# Patient Record
Sex: MALE | Race: WHITE | NOT HISPANIC OR LATINO | Employment: OTHER | URBAN - METROPOLITAN AREA
[De-identification: names, ages, dates, MRNs, and addresses within clinical notes are randomized per-mention and may not be internally consistent; named-entity substitution may affect disease eponyms.]

---

## 2024-05-01 ENCOUNTER — TELEPHONE (OUTPATIENT)
Dept: SLEEP MEDICINE | Facility: HOSPITAL | Age: 74
End: 2024-05-01
Payer: OTHER GOVERNMENT

## 2024-05-01 DIAGNOSIS — R06.81 WITNESSED EPISODE OF APNEA: ICD-10-CM

## 2024-05-01 DIAGNOSIS — G47.33 OBSTRUCTIVE SLEEP APNEA (ADULT) (PEDIATRIC): Primary | ICD-10-CM

## 2024-05-01 DIAGNOSIS — R06.83 SNORING: ICD-10-CM

## 2024-08-26 ENCOUNTER — PROCEDURE VISIT (OUTPATIENT)
Dept: SLEEP MEDICINE | Facility: HOSPITAL | Age: 74
End: 2024-08-26
Attending: PSYCHIATRY & NEUROLOGY
Payer: OTHER GOVERNMENT

## 2024-08-26 DIAGNOSIS — R06.83 SNORING: ICD-10-CM

## 2024-08-26 DIAGNOSIS — G47.33 OBSTRUCTIVE SLEEP APNEA (ADULT) (PEDIATRIC): ICD-10-CM

## 2024-08-26 DIAGNOSIS — R06.81 WITNESSED EPISODE OF APNEA: ICD-10-CM

## 2024-08-26 PROCEDURE — 95811 POLYSOM 6/>YRS CPAP 4/> PARM: CPT | Mod: 26,,, | Performed by: PSYCHIATRY & NEUROLOGY

## 2024-08-26 PROCEDURE — 95811 POLYSOM 6/>YRS CPAP 4/> PARM: CPT

## 2025-07-19 ENCOUNTER — HOSPITAL ENCOUNTER (EMERGENCY)
Facility: HOSPITAL | Age: 75
Discharge: HOME | End: 2025-07-19
Attending: EMERGENCY MEDICINE
Payer: MEDICARE

## 2025-07-19 VITALS
RESPIRATION RATE: 20 BRPM | HEIGHT: 74 IN | HEART RATE: 60 BPM | OXYGEN SATURATION: 99 % | DIASTOLIC BLOOD PRESSURE: 59 MMHG | BODY MASS INDEX: 31.18 KG/M2 | WEIGHT: 243 LBS | SYSTOLIC BLOOD PRESSURE: 133 MMHG | TEMPERATURE: 98 F

## 2025-07-19 VITALS
HEIGHT: 75 IN | OXYGEN SATURATION: 97 % | RESPIRATION RATE: 20 BRPM | HEART RATE: 58 BPM | SYSTOLIC BLOOD PRESSURE: 130 MMHG | BODY MASS INDEX: 30.21 KG/M2 | TEMPERATURE: 98 F | DIASTOLIC BLOOD PRESSURE: 51 MMHG | WEIGHT: 243 LBS

## 2025-07-19 DIAGNOSIS — S52.592A OTHER CLOSED FRACTURE OF DISTAL END OF LEFT RADIUS, INITIAL ENCOUNTER: Primary | ICD-10-CM

## 2025-07-19 DIAGNOSIS — S42.302D CLOSED FRACTURE OF LEFT UPPER EXTREMITY WITH ROUTINE HEALING, SUBSEQUENT ENCOUNTER: Primary | ICD-10-CM

## 2025-07-19 DIAGNOSIS — S52.615A CLOSED NONDISPLACED FRACTURE OF STYLOID PROCESS OF LEFT ULNA, INITIAL ENCOUNTER: ICD-10-CM

## 2025-07-19 DIAGNOSIS — S52.002A CLOSED FRACTURE OF PROXIMAL END OF LEFT ULNA, UNSPECIFIED FRACTURE MORPHOLOGY, INITIAL ENCOUNTER: ICD-10-CM

## 2025-07-19 DIAGNOSIS — S82.51XA CLOSED AVULSION FRACTURE OF MEDIAL MALLEOLUS OF RIGHT TIBIA, INITIAL ENCOUNTER: ICD-10-CM
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
## 2025-07-19 DIAGNOSIS — R55 NEAR SYNCOPE: ICD-10-CM

## 2025-07-19 LAB
ALBUMIN SERPL-MCNC: 4.4 G/DL (ref 3.4–4.8)
ALBUMIN/GLOB SERPL: 1.4 RATIO (ref 1.1–2)
ALP SERPL-CCNC: 61 UNIT/L (ref 40–150)
ALT SERPL-CCNC: 21 UNIT/L (ref 0–55)
ANION GAP SERPL CALC-SCNC: 9 MEQ/L
APTT PPP: 37.9 SECONDS (ref 23.4–33.9)
AST SERPL-CCNC: 27 UNIT/L (ref 11–45)
BASOPHILS # BLD AUTO: 0.04 X10(3)/MCL
BASOPHILS NFR BLD AUTO: 0.3 %
BILIRUB SERPL-MCNC: 0.5 MG/DL
BNP BLD-MCNC: 55.1 PG/ML
BUN SERPL-MCNC: 14.2 MG/DL (ref 8.4–25.7)
CALCIUM SERPL-MCNC: 9.3 MG/DL (ref 8.8–10)
CHLORIDE SERPL-SCNC: 107 MMOL/L (ref 98–107)
CO2 SERPL-SCNC: 25 MMOL/L (ref 23–31)
CREAT SERPL-MCNC: 1.08 MG/DL (ref 0.72–1.25)
CREAT/UREA NIT SERPL: 13
EOSINOPHIL # BLD AUTO: 0.14 X10(3)/MCL (ref 0–0.9)
EOSINOPHIL NFR BLD AUTO: 1.2 %
ERYTHROCYTE [DISTWIDTH] IN BLOOD BY AUTOMATED COUNT: 13.2 % (ref 11.5–17)
GFR SERPLBLD CREATININE-BSD FMLA CKD-EPI: >60 ML/MIN/1.73/M2
GLOBULIN SER-MCNC: 3.1 GM/DL (ref 2.4–3.5)
GLUCOSE SERPL-MCNC: 97 MG/DL (ref 82–115)
HCT VFR BLD AUTO: 43.5 % (ref 42–52)
HCT VFR BLD AUTO: 45.4 % (ref 42–52)
HGB BLD-MCNC: 14.6 G/DL (ref 14–18)
HGB BLD-MCNC: 15.3 G/DL (ref 14–18)
IMM GRANULOCYTES # BLD AUTO: 0.1 X10(3)/MCL (ref 0–0.04)
IMM GRANULOCYTES NFR BLD AUTO: 0.9 %
INR PPP: 1.9
LYMPHOCYTES # BLD AUTO: 1.54 X10(3)/MCL (ref 0.6–4.6)
LYMPHOCYTES NFR BLD AUTO: 13.3 %
MCH RBC QN AUTO: 33.1 PG (ref 27–31)
MCHC RBC AUTO-ENTMCNC: 33.7 G/DL (ref 33–36)
MCV RBC AUTO: 98.3 FL (ref 80–94)
MONOCYTES # BLD AUTO: 1.01 X10(3)/MCL (ref 0.1–1.3)
MONOCYTES NFR BLD AUTO: 8.7 %
NEUTROPHILS # BLD AUTO: 8.79 X10(3)/MCL (ref 2.1–9.2)
NEUTROPHILS NFR BLD AUTO: 75.6 %
NRBC BLD AUTO-RTO: 0 %
PLATELET # BLD AUTO: 209 X10(3)/MCL (ref 130–400)
PMV BLD AUTO: 11.8 FL (ref 7.4–10.4)
POTASSIUM SERPL-SCNC: 3.8 MMOL/L (ref 3.5–5.1)
PROT SERPL-MCNC: 7.5 GM/DL (ref 5.8–7.6)
PROTHROMBIN TIME: 22.1 SECONDS (ref 11.7–14.5)
RBC # BLD AUTO: 4.62 X10(6)/MCL (ref 4.7–6.1)
SODIUM SERPL-SCNC: 141 MMOL/L (ref 136–145)
TROPONIN I SERPL-MCNC: <0.01 NG/ML (ref 0–0.04)
WBC # BLD AUTO: 11.62 X10(3)/MCL (ref 4.5–11.5)

## 2025-07-19 PROCEDURE — 90715 TDAP VACCINE 7 YRS/> IM: CPT | Performed by: EMERGENCY MEDICINE

## 2025-07-19 PROCEDURE — 99285 EMERGENCY DEPT VISIT HI MDM: CPT | Mod: 25

## 2025-07-19 PROCEDURE — 96374 THER/PROPH/DIAG INJ IV PUSH: CPT

## 2025-07-19 PROCEDURE — 85610 PROTHROMBIN TIME: CPT | Performed by: EMERGENCY MEDICINE

## 2025-07-19 PROCEDURE — 96372 THER/PROPH/DIAG INJ SC/IM: CPT | Mod: 59 | Performed by: EMERGENCY MEDICINE

## 2025-07-19 PROCEDURE — 29105 APPLICATION LONG ARM SPLINT: CPT | Mod: LT

## 2025-07-19 PROCEDURE — 25000003 PHARM REV CODE 250: Performed by: EMERGENCY MEDICINE

## 2025-07-19 PROCEDURE — 90471 IMMUNIZATION ADMIN: CPT | Performed by: EMERGENCY MEDICINE

## 2025-07-19 PROCEDURE — 63600175 PHARM REV CODE 636 W HCPCS: Performed by: EMERGENCY MEDICINE

## 2025-07-19 PROCEDURE — 93005 ELECTROCARDIOGRAM TRACING: CPT

## 2025-07-19 PROCEDURE — 25500020 PHARM REV CODE 255: Performed by: EMERGENCY MEDICINE

## 2025-07-19 PROCEDURE — 85018 HEMOGLOBIN: CPT | Performed by: EMERGENCY MEDICINE

## 2025-07-19 PROCEDURE — 83880 ASSAY OF NATRIURETIC PEPTIDE: CPT | Performed by: EMERGENCY MEDICINE

## 2025-07-19 PROCEDURE — 96375 TX/PRO/DX INJ NEW DRUG ADDON: CPT

## 2025-07-19 PROCEDURE — 93010 ELECTROCARDIOGRAM REPORT: CPT | Mod: ,,, | Performed by: INTERNAL MEDICINE

## 2025-07-19 PROCEDURE — 85025 COMPLETE CBC W/AUTO DIFF WBC: CPT | Performed by: EMERGENCY MEDICINE

## 2025-07-19 PROCEDURE — 80053 COMPREHEN METABOLIC PANEL: CPT | Performed by: EMERGENCY MEDICINE

## 2025-07-19 PROCEDURE — 84484 ASSAY OF TROPONIN QUANT: CPT | Performed by: EMERGENCY MEDICINE

## 2025-07-19 PROCEDURE — 99284 EMERGENCY DEPT VISIT MOD MDM: CPT | Mod: 25,27

## 2025-07-19 PROCEDURE — 85730 THROMBOPLASTIN TIME PARTIAL: CPT | Performed by: EMERGENCY MEDICINE

## 2025-07-19 PROCEDURE — 63600175 PHARM REV CODE 636 W HCPCS: Mod: JZ,TB | Performed by: EMERGENCY MEDICINE

## 2025-07-19 RX ORDER — OXYCODONE AND ACETAMINOPHEN 5; 325 MG/1; MG/1
1 TABLET ORAL EVERY 6 HOURS PRN
Qty: 16 TABLET | Refills: 0 | Status: ON HOLD | OUTPATIENT
Start: 2025-07-19 | End: 2025-07-25 | Stop reason: HOSPADM

## 2025-07-19 RX ORDER — HYDROCODONE BITARTRATE AND ACETAMINOPHEN 5; 325 MG/1; MG/1
1 TABLET ORAL EVERY 6 HOURS PRN
Qty: 12 TABLET | Refills: 0 | Status: CANCELLED | OUTPATIENT
Start: 2025-07-19

## 2025-07-19 RX ORDER — DIPHENHYDRAMINE HYDROCHLORIDE 50 MG/ML
25 INJECTION, SOLUTION INTRAMUSCULAR; INTRAVENOUS
Status: COMPLETED | OUTPATIENT
Start: 2025-07-19 | End: 2025-07-19

## 2025-07-19 RX ORDER — METHYLPREDNISOLONE SOD SUCC 125 MG
125 VIAL (EA) INJECTION
Status: COMPLETED | OUTPATIENT
Start: 2025-07-19 | End: 2025-07-19

## 2025-07-19 RX ORDER — FENTANYL CITRATE 50 UG/ML
50 INJECTION, SOLUTION INTRAMUSCULAR; INTRAVENOUS
Refills: 0 | Status: COMPLETED | OUTPATIENT
Start: 2025-07-19 | End: 2025-07-19

## 2025-07-19 RX ORDER — OXYCODONE AND ACETAMINOPHEN 5; 325 MG/1; MG/1
1 TABLET ORAL
Refills: 0 | Status: COMPLETED | OUTPATIENT
Start: 2025-07-19 | End: 2025-07-19

## 2025-07-19 RX ADMIN — IOHEXOL 100 ML: 350 INJECTION, SOLUTION INTRAVENOUS at 05:07

## 2025-07-19 RX ADMIN — CLOSTRIDIUM TETANI TOXOID ANTIGEN (FORMALDEHYDE INACTIVATED), CORYNEBACTERIUM DIPHTHERIAE TOXOID ANTIGEN (FORMALDEHYDE INACTIVATED), BORDETELLA PERTUSSIS TOXOID ANTIGEN (GLUTARALDEHYDE INACTIVATED), BORDETELLA PERTUSSIS FILAMENTOUS HEMAGGLUTININ ANTIGEN (FORMALDEHYDE INACTIVATED), BORDETELLA PERTUSSIS PERTACTIN ANTIGEN, AND BORDETELLA PERTUSSIS FIMBRIAE 2/3 ANTIGEN 0.5 ML: 5; 2; 2.5; 5; 3; 5 INJECTION, SUSPENSION INTRAMUSCULAR at 12:07

## 2025-07-19 RX ADMIN — FENTANYL CITRATE 50 MCG: 50 INJECTION INTRAMUSCULAR; INTRAVENOUS at 12:07

## 2025-07-19 RX ADMIN — METHYLPREDNISOLONE SODIUM SUCCINATE 125 MG: 125 INJECTION, POWDER, FOR SOLUTION INTRAMUSCULAR; INTRAVENOUS at 04:07

## 2025-07-19 RX ADMIN — DIPHENHYDRAMINE HYDROCHLORIDE 25 MG: 50 INJECTION INTRAMUSCULAR; INTRAVENOUS at 04:07

## 2025-07-19 RX ADMIN — OXYCODONE HYDROCHLORIDE AND ACETAMINOPHEN 1 TABLET: 5; 325 TABLET ORAL at 06:07

## 2025-07-19 NOTE — ED PROVIDER NOTES
Encounter Date: 7/19/2025       History     Chief Complaint   Patient presents with    Wrist Injury     Pt c/o pain to left wrist s/p ladder falling down with him on third rung. Pt also c/o pain to right 5th finger, right ankle and bilateral shin.     Patient is a 74 yo M presenting with R ankle pain, Left arm pain s/p fall. He was on a ladder and on approxmately the 3rd rung from the ground when it started to slip to the side. He rode the ladder down to the ground. The 3rd rung was from approx 3-5 ft. He did not strike his head. He had no Loc. He is on xarelto. Prior to this he was in his normal state of health. At this time he c/o mild abrasion to the R 5th digit, left wrist, bilateral hips, and R ankle. No neck pain, worse from baseline back pain, numbness, or tingling. No fevers, chills, chest pain, sob.         Review of patient's allergies indicates:   Allergen Reactions    Iodine Hives, Itching, Rash and Swelling     Other Reaction(s): Unknown    Amitriptyline     Amlodipine      Other Reaction(s): throat tightness    Clopidogrel      Other Reaction(s): Unknown    Codeine     Digoxin Other (See Comments)     Other Reaction(s): GYNECOMASTIA    Gynecomastia    Finasteride Other (See Comments)     Gynecomastia    Lovastatin      Other Reaction(s): Abdominal pain    Methadone      Other Reaction(s): Sedated    Metoprolol succinate      Other Reaction(s): low heart rate    Pravastatin      Other Reaction(s): INEFFECTIVE    Tramadol     Methocarbamol Nausea And Vomiting    Penicillins Rash     Other Reaction(s): Eruption of skin, O/E - lip swelling, Eye Swelling, Edema Of Pharynx, O/E - lip swelling, Eye Swelling, Edema Of Pharynx, O/E - lip swelling, Eye Swelling, Edema Of Pharynx, SWELLING-LIPS, SWELLING-THROAT, SWELLING-EYES, SWELLING-LIPS, SWELLING-THROAT, SWELLING-EYES, SWELLING-LIPS, SWELLING-THROAT, SWELLING-EYES, Unknown     No past medical history on file.  No past surgical history on file.  No family  history on file.  Social History[1]  Review of Systems   All other systems reviewed and are negative.      Physical Exam     Initial Vitals [07/19/25 1159]   BP Pulse Resp Temp SpO2   (!) 133/59 60 20 98.2 °F (36.8 °C) 99 %      MAP       --         Physical Exam    Nursing note and vitals reviewed.  Constitutional: He appears well-developed and well-nourished. He is not diaphoretic. No distress.   HENT:   Head: Normocephalic and atraumatic.   Eyes: Conjunctivae are normal.   No midline c spine ttp   Neck: Neck supple.   Cardiovascular:  Normal rate, regular rhythm and normal heart sounds.           Pulmonary/Chest: Breath sounds normal. No respiratory distress. He has no wheezes. He has no rhonchi.   Abdominal: Abdomen is soft. Bowel sounds are normal. He exhibits no distension. There is no abdominal tenderness. There is no rebound and no guarding.   Musculoskeletal:         General: No edema. Normal range of motion.      Cervical back: Neck supple.      Comments: No midline TTP of the thoracic or lumbar spine. No step offs or deformities. No trauma noted to the chest, back, or abdomen.   RUE: Neurovascularly intact. There is  small abrasion to the R 5th digit. ROM intact.  LUE: Radial pulse +2. Cap refill < 2 sec. Patient able to flex and extend digits in that hand. There is + mild deformity of the left distal forearm. No open wounds. There is mild TTP of the left shoulder but no deformity noted.     RLE: Neurovascularly intact. Abrasion to the R anterior shin. There is moderate TTP of the R hip ROM intact. R ankle has mild to moderate diffuse swelling no deformity. There is focal TTP of the medial malleolus. No open wounds.   LLE: Neurovascularly intact. Abrasions to the left anterior shin. Mild TTP of the left lateral hip. ROM intact.      Neurological: He is alert and oriented to person, place, and time.   Skin: Skin is warm and dry.   Psychiatric: He has a normal mood and affect. Thought content normal.          ED Course   Procedures  Labs Reviewed   PROTIME-INR - Abnormal       Result Value    PT 22.1 (*)     INR 1.9 (*)     Narrative:     Protimes are used to monitor anticoagulant agents such as warfarin. PT INR values are based on the current patient normal mean and the HOMER value for the specific instrument reagent used.  **Routine theraputic target values for the INR are 2.0-3.0**   APTT - Abnormal    PTT 37.9 (*)    CBC WITH DIFFERENTIAL - Abnormal    WBC 11.62 (*)     RBC 4.62 (*)     Hgb 15.3      Hct 45.4      MCV 98.3 (*)     MCH 33.1 (*)     MCHC 33.7      RDW 13.2      Platelet 209      MPV 11.8 (*)     Neut % 75.6      Lymph % 13.3      Mono % 8.7      Eos % 1.2      Basophil % 0.3      Imm Grans % 0.9      Neut # 8.79      Lymph # 1.54      Mono # 1.01      Eos # 0.14      Baso # 0.04      Imm Gran # 0.10 (*)     NRBC% 0.0     CBC W/ AUTO DIFFERENTIAL    Narrative:     The following orders were created for panel order CBC auto differential.  Procedure                               Abnormality         Status                     ---------                               -----------         ------                     CBC with Differential[[id number]]       Abnormal            Final result                 Please view results for these tests on the individual orders.   COMPREHENSIVE METABOLIC PANEL    Sodium 141      Potassium 3.8      Chloride 107      CO2 25      Glucose 97      Blood Urea Nitrogen 14.2      Creatinine 1.08      Calcium 9.3      Protein Total 7.5      Albumin 4.4      Globulin 3.1      Albumin/Globulin Ratio 1.4      Bilirubin Total 0.5      ALP 61      ALT 21      AST 27      eGFR >60      Anion Gap 9.0      BUN/Creatinine Ratio 13            Imaging Results              X-Ray Ankle Complete Right (Final result)  Result time 07/19/25 13:27:40      Final result by Jaswinder Freitas MD (07/19/25 13:27:40)                   Impression:      Acute appearing small avulsed fragment from the tip  of the medial malleolus.      Electronically signed by: Jaswinder Freitas  Date:    07/19/2025  Time:    13:27               Narrative:    EXAMINATION:  XR ANKLE COMPLETE 3 VIEW RIGHT    CLINICAL HISTORY:  Unspecified fall, initial encounter    TECHNIQUE:  Three views    COMPARISON:  None available.    FINDINGS:  There is minimal avulsed fractured fragment from the tip of the medial malleolus and appears to be acute.  No other fracture or dislocation identified.  Calcaneal enthesophyte.                                       X-Ray Hips Bilateral 2 View Incl AP Pelvis (Final result)  Result time 07/19/25 13:37:37      Final result by Abhishek Eason MD (07/19/25 13:37:37)                   Impression:      No acute findings.      Electronically signed by: Abhishek Eason  Date:    07/19/2025  Time:    13:37               Narrative:    EXAMINATION:  XR HIPS BILATERAL 2 VIEW INCL AP PELVIS    CLINICAL HISTORY:  Unspecified fall, initial encounter    COMPARISON:  None    FINDINGS:  Frontal view of the pelvis with two views bilateral hips.  There is no fracture or dislocation.  Mild to moderate degenerative changes of the hips.                                       X-Ray Wrist Complete Left (Final result)  Result time 07/19/25 13:43:03      Final result by Jaswinder Freitas MD (07/19/25 13:43:03)                   Impression:      Fractures.      Electronically signed by: Jaswinder Freitas  Date:    07/19/2025  Time:    13:43               Narrative:    EXAMINATION:  XR WRIST COMPLETE 3 VIEWS LEFT    CLINICAL HISTORY:  Fall.    TECHNIQUE:  Three views.    COMPARISON:  None available.    FINDINGS:  There is impacted displaced comminuted fracture of the distal radius involving portion of the articular surface.  There is also fracture across the ulnar styloid process.                                       X-Ray Elbow Complete Left (Final result)  Result time 07/19/25 13:46:43      Final result by Jaswinder Freitas MD (07/19/25 13:46:43)                    Impression:      There appears fine fracture proximal ulna without significant displacement or angulation.  Please correlate clinically for local pain.      Electronically signed by: Jaswinder Freitas  Date:    07/19/2025  Time:    13:46               Narrative:    EXAMINATION:  XR ELBOW COMPLETE 3 VIEW LEFT    CLINICAL HISTORY:  Unspecified fall, initial encounter    TECHNIQUE:  Three views    COMPARISON:  None available    FINDINGS:  There appears fine fracture involve the proximal ulna without significant displacement or angulation.  Radial head is not fracture.  There is no fracture of the humeral epicondyles                        Wet Read by Anjelica Tran MD (07/19/25 13:39:20, [facility] - Emergency Dept, Emergency Medicine)    ? Fracture of proximal ulna                                     X-Ray Shoulder Trauma Left (Final result)  Result time 07/19/25 13:26:19      Final result by Jaswinder Freitas MD (07/19/25 13:26:19)                   Impression:      No acute osseous abnormality identified.      Electronically signed by: Jaswinder Freitas  Date:    07/19/2025  Time:    13:26               Narrative:    EXAMINATION:  Left shoulder.    CLINICAL HISTORY:  Fall    TECHNIQUE:  Three views.    COMPARISON:  August 29, 2020    FINDINGS:  Degenerative changes involve the acromioclavicular joint and the glenohumeral articulation.  No acute fracture or dislocation identified.  Calcification adjacent to the greater tuberosity is consistent with rotator cuff calcified tendinopathy.                                       Medications   Tdap vaccine injection 0.5 mL (0.5 mLs Intramuscular Given 7/19/25 1234)   fentaNYL injection 50 mcg (50 mcg Intravenous Given 7/19/25 1234)     Medical Decision Making  See ED course.     Patient is a 74 yo m presenting s/p fall. He is on anticoagulants. He had no trunk/chest/abdomen/back complaints. He did have a mildly displaced left forearm fracture and  an avulsion fracture of the R ankle. This was discussed with Dr. Cheema. Recommended admission as there is a concern with opposing side injuries and the patient's age and risk factors that he may not be safe to discharge to home. He is here with his family. After a long discussion of risks, benefits, and our concerns, the patient declines admission. He was able to ambulate steadily on walking boot and with left arm in splint and sling. I again discussed our concerns and signs and symptoms to return for. His arm was neurovascularly intact post splinting. He had no new abdominal pain or chest pain on re-examination. Patient and family expressed understand and still refusing admission.    Problems Addressed:  Closed avulsion fracture of medial malleolus of right tibia, initial encounter: acute illness or injury  Closed fracture of proximal end of left ulna, unspecified fracture morphology, initial encounter: acute illness or injury  Closed nondisplaced fracture of styloid process of left ulna, initial encounter: acute illness or injury  Fall: acute illness or injury  Other closed fracture of distal end of left radius, initial encounter: acute illness or injury    Amount and/or Complexity of Data Reviewed  Labs: ordered.  Radiology: ordered and independent interpretation performed.    Risk  Prescription drug management.               ED Course as of 07/21/25 1103   Sat Jul 19, 2025   1350 XR R ankle:   Acute appearing small avulsed fragment from the tip of the medial malleolus.    [GM]   1351 XR elbow:    There appears fine fracture proximal ulna without significant displacement or angulation     [GM]   1351 XR L wrist: There is impacted displaced comminuted fracture of the distal radius involving portion of the articular surface.  There is also fracture across the ulnar styloid process.      [GM]   1358 Dr. Cheema consulted for fractures on plain films [GM]   1438 Discussed case with Dr. Cheema who recommended admission for  placement.  I discussed this with the patient who feels like he can manage fine at home and is refusing admission. I discussed with him our concern for increased risk of falls and further injury as he will be in walking boot and splint on left arm, making balance difficult. In addition, he will likely need surgical repair of the left arm.  [GM]   1527 Patient was able to ambulate steadily [GM]      ED Course User Index  [GM] Anjelica Tran MD                               Clinical Impression:  Final diagnoses:  [W19.XXXA] Fall  [S52.592A] Other closed fracture of distal end of left radius, initial encounter (Primary)  [S52.615A] Closed nondisplaced fracture of styloid process of left ulna, initial encounter  [S52.002A] Closed fracture of proximal end of left ulna, unspecified fracture morphology, initial encounter  [S82.51XA] Closed avulsion fracture of medial malleolus of right tibia, initial encounter          ED Disposition Condition    Discharge Stable          ED Prescriptions       Medication Sig Dispense Start Date End Date Auth. Provider    oxyCODONE-acetaminophen (PERCOCET) 5-325 mg per tablet Take 1 tablet by mouth every 6 (six) hours as needed for Pain. 16 tablet 7/19/2025 -- Anjelica Tran MD          Follow-up Information       Follow up With Specialties Details Why Contact Armaan De Luna DO Orthopedic Surgery Call  Monday for follow up appointment. [address] [facility]  [address]  [facility] [address]  [phone]      [facility] - Emergency Dept Emergency Medicine  If symptoms worsen [address]  [facility] [id number] [phone]                   [1]         Anjelica Tran MD  07/21/25 0291

## 2025-07-19 NOTE — DISCHARGE INSTRUCTIONS
Today we did offer you admission for monitoring, placement, and possible surgery.  At this time you declined understanding that the orthopedic surgeon did recommend admission.  If you find your pain is worsening, you have new symptoms, or difficulty functioning at home please return to the emergency department.

## 2025-07-19 NOTE — ED PROVIDER NOTES
Encounter Date: 7/19/2025       History     Chief Complaint   Patient presents with    Fatigue     Pt was discharged from our ER and pts family member brought him back stating pt felt like he was about to pass out and then he broke out into a cold sweat. Pt denies actually passing out.      Patient is a 75-year-old male returns after prior discharge after near syncopal episode.  The patient is on Xarelto and he had a fall from approximately 3rd rung ladder earlier today.  He in the ladder slipped to the side and he landed onto mostly his left side.  This resulted in an distal radius and ulna fracture along with the olecranon fracture and a right avulsion fracture of the medial malleolus.  Patient had no chest pain abdominal pain and no signs of trauma on torso.  He was offered admission for his fractures for possible placement.  He declined at that time after discussion of risks and benefits and was discharge.  Patient was in the process of going to get his medication when he had an episode where he felt lightheaded and he his vision sandi out.  He denies any full loss of consciousness. He had no lateralizing symptoms, slurred speech, or facial asymmetry. He had started to sweat and felt lightheaded.  The symptoms have resolved at this time.  He is now feeling back to baseline.  He also reports prior to this episode he had severe throbbing in his left hand. This occurred in the car and he did not fall or re-injure himself.        Review of patient's allergies indicates:   Allergen Reactions    Iodine Hives, Itching, Rash and Swelling     Other Reaction(s): Unknown    Amitriptyline     Amlodipine      Other Reaction(s): throat tightness    Clopidogrel      Other Reaction(s): Unknown    Codeine     Digoxin Other (See Comments)     Other Reaction(s): GYNECOMASTIA    Gynecomastia    Finasteride Other (See Comments)     Gynecomastia    Lovastatin      Other Reaction(s): Abdominal pain    Methadone      Other  Reaction(s): Sedated    Metoprolol succinate      Other Reaction(s): low heart rate    Pravastatin      Other Reaction(s): INEFFECTIVE    Tramadol     Methocarbamol Nausea And Vomiting    Penicillins Rash     Other Reaction(s): Eruption of skin, O/E - lip swelling, Eye Swelling, Edema Of Pharynx, O/E - lip swelling, Eye Swelling, Edema Of Pharynx, O/E - lip swelling, Eye Swelling, Edema Of Pharynx, SWELLING-LIPS, SWELLING-THROAT, SWELLING-EYES, SWELLING-LIPS, SWELLING-THROAT, SWELLING-EYES, SWELLING-LIPS, SWELLING-THROAT, SWELLING-EYES, Unknown     History reviewed. No pertinent past medical history.  History reviewed. No pertinent surgical history.  No family history on file.  Social History[1]  Review of Systems   All other systems reviewed and are negative.      Physical Exam     Initial Vitals [07/19/25 1616]   BP Pulse Resp Temp SpO2   (!) 130/51 (!) 58 18 98.3 °F (36.8 °C) 97 %      MAP       --         Physical Exam    Nursing note and vitals reviewed.  Constitutional: He appears well-developed and well-nourished. He is not diaphoretic. No distress.   HENT:   Head: Normocephalic and atraumatic.   Eyes: Conjunctivae are normal. Pupils are equal, round, and reactive to light.   Neck: Neck supple.   Cardiovascular:  Normal rate, regular rhythm and normal heart sounds.           Pulmonary/Chest: Breath sounds normal. No respiratory distress. He has no wheezes. He has no rhonchi.   Abdominal: Abdomen is soft. Bowel sounds are normal. He exhibits no distension. There is no abdominal tenderness. There is no rebound and no guarding.   Musculoskeletal:      Cervical back: Neck supple.      Comments: Patient has a walking boot on the R foot which was placed earlier. The left arm in in the sugartong splint. Splint was loosened slightly and repositioned. LUE is neurovascularly intact.      Neurological: He is alert and oriented to person, place, and time.   Skin: Skin is warm and dry.   Psychiatric: He has a normal mood  and affect. Thought content normal.         ED Course   Procedures  Labs Reviewed   TROPONIN I - Normal       Result Value    Troponin-I <0.010     B-TYPE NATRIURETIC PEPTIDE - Normal    Natriuretic Peptide 55.1     HEMOGLOBIN AND HEMATOCRIT, BLOOD - Normal    Hgb 14.6      Hct 43.5          ECG Results              EKG 12-lead (Final result)        Collection Time Result Time QRS Duration OHS QTC Calculation    07/19/25 16:43:35 07/20/25 07:33:29 90 424                     Final result by Interface, Lab In [facility] (07/20/25 07:33:35)                   Narrative:    Test Reason : R55,    Vent. Rate :  57 BPM     Atrial Rate :  57 BPM     P-R Int : 162 ms          QRS Dur :  90 ms      QT Int : 436 ms       P-R-T Axes :  77  62  94 degrees    QTcB Int : 424 ms    Sinus bradycardia  Otherwise normal ECG  No previous ECGs available  Confirmed by Clayton Dyson ([id number]) on 7/20/2025 7:33:26 AM    Referred By: AAAREFERRAL SELF           Confirmed By: Clayton Dyson                                  Recent Results (from the past 6 hours)   Comprehensive Metabolic Panel    Collection Time: 07/19/25  2:30 PM   Result Value Ref Range    Sodium 141 136 - 145 mmol/L    Potassium 3.8 3.5 - 5.1 mmol/L    Chloride 107 98 - 107 mmol/L    CO2 25 23 - 31 mmol/L    Glucose 97 82 - 115 mg/dL    Blood Urea Nitrogen 14.2 8.4 - 25.7 mg/dL    Creatinine 1.08 0.72 - 1.25 mg/dL    Calcium 9.3 8.8 - 10.0 mg/dL    Protein Total 7.5 5.8 - 7.6 gm/dL    Albumin 4.4 3.4 - 4.8 g/dL    Globulin 3.1 2.4 - 3.5 gm/dL    Albumin/Globulin Ratio 1.4 1.1 - 2.0 ratio    Bilirubin Total 0.5 <=1.5 mg/dL    ALP 61 40 - 150 unit/L    ALT 21 0 - 55 unit/L    AST 27 11 - 45 unit/L    eGFR >60 mL/min/1.73/m2    Anion Gap 9.0 mEq/L    BUN/Creatinine Ratio 13    Protime-INR    Collection Time: 07/19/25  2:30 PM   Result Value Ref Range    PT 22.1 (H) 11.7 - 14.5 seconds    INR 1.9 (H) <=1.3   APTT    Collection Time: 07/19/25  2:30 PM   Result Value Ref Range    PTT  37.9 (H) 23.4 - 33.9 seconds   CBC with Differential    Collection Time: 07/19/25  2:30 PM   Result Value Ref Range    WBC 11.62 (H) 4.50 - 11.50 x10(3)/mcL    RBC 4.62 (L) 4.70 - 6.10 x10(6)/mcL    Hgb 15.3 14.0 - 18.0 g/dL    Hct 45.4 42.0 - 52.0 %    MCV 98.3 (H) 80.0 - 94.0 fL    MCH 33.1 (H) 27.0 - 31.0 pg    MCHC 33.7 33.0 - 36.0 g/dL    RDW 13.2 11.5 - 17.0 %    Platelet 209 130 - 400 x10(3)/mcL    MPV 11.8 (H) 7.4 - 10.4 fL    Neut % 75.6 %    Lymph % 13.3 %    Mono % 8.7 %    Eos % 1.2 %    Basophil % 0.3 %    Imm Grans % 0.9 %    Neut # 8.79 2.1 - 9.2 x10(3)/mcL    Lymph # 1.54 0.6 - 4.6 x10(3)/mcL    Mono # 1.01 0.1 - 1.3 x10(3)/mcL    Eos # 0.14 0 - 0.9 x10(3)/mcL    Baso # 0.04 <=0.2 x10(3)/mcL    Imm Gran # 0.10 (H) 0.00 - 0.04 x10(3)/mcL    NRBC% 0.0 %     Imaging Results              CT Chest Abdomen Pelvis With IV Contrast (XPD) NO Oral Contrast (Final result)  Result time 07/19/25 17:34:46      Final result by Jaswinder Freitas MD (07/19/25 17:34:46)                   Impression:      No traumatic injury of the thorax, abdomen or pelvis identified.  Details of the findings above.      Electronically signed by: Jaswinder Freitas  Date:    07/19/2025  Time:    17:34               Narrative:    EXAMINATION:  CT CHEST ABDOMEN PELVIS WITH IV CONTRAST (XPD)    CLINICAL HISTORY:  Fall, on xarelto, near syncopal episode;    TECHNIQUE:  Multidetector axial images were obtained from the thoracic inlet through the greater trochanters following the administration of IV contrast.    Dose length product of 1024 mGycm. Automated exposure control was utilized to minimize radiation dose.    COMPARISON:  None available.    CHEST FINDINGS:    The lungs are unremarkable without  parenchyma consolidation,  pleural effusion or pneumothorax.  There are left lung subpleural reticulations consistent with some fibrosis.  Cardiac chambers are borderline enlarged in size.  Coronary arteries calcified plaque..    Images are  partially degraded by respiratory misregistration. No traumatic finding of the thoracic great vessels identified and there are no dominant mediastinal hematomas.  Sternotomy changes.  No consistent findings reflective of a displaced fracture.    ABDOMINAL FINDINGS:    There is no abdominal solid parenchymal organs traumatic damage with unremarkable attenuation of the liver, pancreas and spleen. Gallbladder wall is not thickened and there is no intra luminal calcified calculus.    The adrenal glands size and configuration is within normal limits. Kidneys are symmetric in size and exhibit symmetric contrast enhancement.  There is right renal parapelvic cyst.  There are also left kidney small hypodensities which are again probable cysts but are small to characterize.  No renal contusion or laceration identified. There is no hydronephrosis or perinephric fluid collection.  Abdominal aorta is remarkable calcified plaques without aneurysmal dilatation.  No retroperitoneal hematoma. There is no extra luminal air.  There is descending and sigmoid colon diverticulosis coli without pericolonic acute strandings.  No free fluid identified.  There is grade 1 anterolisthesis of L5 on S1 bilateral L5 pars defects.    PELVIC FINDINGS:    There is no free fluid. Urinary bladder appears within normal limits without wall thickening. No evidence for bladder rupture. Femoral heads are well situated within their respective acetabula. Pubic symphysis and SI joints are intact. No pelvic fracture identified.                                      Imaging Results              X-Ray Ankle Complete Right (Final result)  Result time 07/19/25 13:27:40      Final result by Jaswinder Freitas MD (07/19/25 13:27:40)                   Impression:      Acute appearing small avulsed fragment from the tip of the medial malleolus.      Electronically signed by: Jaswinder Freitas  Date:    07/19/2025  Time:    13:27               Narrative:    EXAMINATION:  XR  ANKLE COMPLETE 3 VIEW RIGHT    CLINICAL HISTORY:  Unspecified fall, initial encounter    TECHNIQUE:  Three views    COMPARISON:  None available.    FINDINGS:  There is minimal avulsed fractured fragment from the tip of the medial malleolus and appears to be acute.  No other fracture or dislocation identified.  Calcaneal enthesophyte.                                       X-Ray Hips Bilateral 2 View Incl AP Pelvis (Final result)  Result time 07/19/25 13:37:37      Final result by Abhishek Eason MD (07/19/25 13:37:37)                   Impression:      No acute findings.      Electronically signed by: Abhishek Eason  Date:    07/19/2025  Time:    13:37               Narrative:    EXAMINATION:  XR HIPS BILATERAL 2 VIEW INCL AP PELVIS    CLINICAL HISTORY:  Unspecified fall, initial encounter    COMPARISON:  None    FINDINGS:  Frontal view of the pelvis with two views bilateral hips.  There is no fracture or dislocation.  Mild to moderate degenerative changes of the hips.                                       X-Ray Wrist Complete Left (Final result)  Result time 07/19/25 13:43:03      Final result by Jaswinder Freitas MD (07/19/25 13:43:03)                   Impression:      Fractures.      Electronically signed by: Jaswinder Freitas  Date:    07/19/2025  Time:    13:43               Narrative:    EXAMINATION:  XR WRIST COMPLETE 3 VIEWS LEFT    CLINICAL HISTORY:  Fall.    TECHNIQUE:  Three views.    COMPARISON:  None available.    FINDINGS:  There is impacted displaced comminuted fracture of the distal radius involving portion of the articular surface.  There is also fracture across the ulnar styloid process.                                       X-Ray Elbow Complete Left (Final result)  Result time 07/19/25 13:46:43      Final result by Jaswinder Freitas MD (07/19/25 13:46:43)                   Impression:      There appears fine fracture proximal ulna without significant displacement or angulation.  Please correlate  clinically for local pain.      Electronically signed by: Jaswinder Freitas  Date:    07/19/2025  Time:    13:46               Narrative:    EXAMINATION:  XR ELBOW COMPLETE 3 VIEW LEFT    CLINICAL HISTORY:  Unspecified fall, initial encounter    TECHNIQUE:  Three views    COMPARISON:  None available    FINDINGS:  There appears fine fracture involve the proximal ulna without significant displacement or angulation.  Radial head is not fracture.  There is no fracture of the humeral epicondyles                        Wet Read by Anjelica Tran MD (07/19/25 13:39:20, [facility] - Emergency Dept, Emergency Medicine)    ? Fracture of proximal ulna                                     X-Ray Shoulder Trauma Left (Final result)  Result time 07/19/25 13:26:19      Final result by Jaswinder Freitas MD (07/19/25 13:26:19)                   Impression:      No acute osseous abnormality identified.      Electronically signed by: Jaswinder Freitas  Date:    07/19/2025  Time:    13:26               Narrative:    EXAMINATION:  Left shoulder.    CLINICAL HISTORY:  Fall    TECHNIQUE:  Three views.    COMPARISON:  August 29, 2020    FINDINGS:  Degenerative changes involve the acromioclavicular joint and the glenohumeral articulation.  No acute fracture or dislocation identified.  Calcification adjacent to the greater tuberosity is consistent with rotator cuff calcified tendinopathy.                                         Medications   diphenhydrAMINE injection 25 mg (25 mg Intravenous Given 7/19/25 1653)   methylPREDNISolone sodium succinate injection 125 mg (125 mg Intravenous Given 7/19/25 1653)   iohexoL (OMNIPAQUE 350) injection 100 mL (100 mLs Intravenous Given 7/19/25 1708)   oxyCODONE-acetaminophen 5-325 mg per tablet 1 tablet (1 tablet Oral Given 7/19/25 1825)     Medical Decision Making  Patient is a 75-year-old male status post fall earlier today who presents with an episode of lightheadedness and near-syncope.  He  had been offered admission earlier today but declined.  Upon returned today patient is back to his baseline.  What he describes sounds like a near syncopal episode.  He had no lateralizing symptoms to suggest stroke.  With the severe pain he had in his left wrist with the time it was likely vasovagal.  However considering the patient's risk factors and earlier fall, the workup was expanded. No emergent findings on CT CAP. I did again offer the patient admission. Again, after a discussion of risks and benefits, he declines. He has opted to sign out against medical advice.    Problems Addressed:  Closed fracture of left upper extremity with routine healing, subsequent encounter: acute illness or injury  Near syncope: acute illness or injury    Amount and/or Complexity of Data Reviewed  Labs: ordered.  Radiology: ordered.    Risk  Prescription drug management.               ED Course as of 07/21/25 1107   Sat Jul 19, 2025   1646 Patient reports he has tolerated Iodine contrast previously with benadryl pretreatment. Pre treatment ordered. [GM]   1810 No emergent findings on CT. Troponin negative.  [GM]      ED Course User Index  [GM] Anjelica Tran MD                               Clinical Impression:  Final diagnoses:  [R55] Near syncope  [S42.302D] Closed fracture of left upper extremity with routine healing, subsequent encounter (Primary)          ED Disposition Condition    AMA                       [1]         Anjelica Tran MD  07/21/25 1107

## 2025-07-20 LAB
OHS QRS DURATION: 90 MS
OHS QTC CALCULATION: 424 MS

## 2025-07-23 ENCOUNTER — ANESTHESIA EVENT (OUTPATIENT)
Dept: SURGERY | Facility: HOSPITAL | Age: 75
End: 2025-07-23
Payer: MEDICARE

## 2025-07-23 ENCOUNTER — OFFICE VISIT (OUTPATIENT)
Dept: ORTHOPEDICS | Facility: CLINIC | Age: 75
End: 2025-07-23
Payer: OTHER GOVERNMENT

## 2025-07-23 VITALS
HEIGHT: 74 IN | WEIGHT: 242.5 LBS | SYSTOLIC BLOOD PRESSURE: 159 MMHG | HEART RATE: 75 BPM | DIASTOLIC BLOOD PRESSURE: 68 MMHG | BODY MASS INDEX: 31.12 KG/M2

## 2025-07-23 DIAGNOSIS — S52.592A OTHER CLOSED FRACTURE OF DISTAL END OF LEFT RADIUS, INITIAL ENCOUNTER: ICD-10-CM

## 2025-07-23 DIAGNOSIS — S82.51XA CLOSED AVULSION FRACTURE OF MEDIAL MALLEOLUS OF RIGHT TIBIA, INITIAL ENCOUNTER: ICD-10-CM

## 2025-07-23 DIAGNOSIS — S52.002A CLOSED FRACTURE OF PROXIMAL END OF LEFT ULNA, UNSPECIFIED FRACTURE MORPHOLOGY, INITIAL ENCOUNTER: ICD-10-CM

## 2025-07-23 PROCEDURE — 99204 OFFICE O/P NEW MOD 45 MIN: CPT | Mod: ,,, | Performed by: ORTHOPAEDIC SURGERY

## 2025-07-23 RX ORDER — PREDNISONE 20 MG/1
TABLET ORAL
Status: ON HOLD | COMMUNITY
Start: 2024-09-17 | End: 2025-07-25 | Stop reason: HOSPADM

## 2025-07-23 RX ORDER — VIT C/E/ZN/COPPR/LUTEIN/ZEAXAN 250MG-90MG
1000 CAPSULE ORAL EVERY MORNING
Status: ON HOLD | COMMUNITY
End: 2025-07-25 | Stop reason: HOSPADM

## 2025-07-23 RX ORDER — CLOPIDOGREL BISULFATE 75 MG/1
75 TABLET ORAL
Status: ON HOLD | COMMUNITY
Start: 2024-10-01 | End: 2025-07-25 | Stop reason: HOSPADM

## 2025-07-23 RX ORDER — PNV NO.95/FERROUS FUM/FOLIC AC 28MG-0.8MG
TABLET ORAL
Status: ON HOLD | COMMUNITY
End: 2025-07-25 | Stop reason: HOSPADM

## 2025-07-23 RX ORDER — ROSUVASTATIN CALCIUM 20 MG/1
10 TABLET, COATED ORAL DAILY
COMMUNITY
Start: 2025-06-03

## 2025-07-23 RX ORDER — MUPIROCIN 20 MG/G
OINTMENT TOPICAL
Status: CANCELLED | OUTPATIENT
Start: 2025-07-23

## 2025-07-23 RX ORDER — SODIUM CHLORIDE 0.9 % (FLUSH) 0.9 %
10 SYRINGE (ML) INJECTION
Status: CANCELLED | OUTPATIENT
Start: 2025-07-23

## 2025-07-23 NOTE — PROGRESS NOTES
Subjective:       Patient ID: Clayton Sanabria is a 75 y.o. male.  Chief Complaint   Patient presents with    Left Wrist - Injury     4 days out from left distal radius fx , proximal ulna fx. Present in splint. Right ankle avulsion fx.        HPI:  History of Present Illness    HPI:  Mr. Sanabria presents with a severe left wrist injury from falling off a ladder approximately 4-5 feet high, landing on his outstretched arm. He describes his wrist as severely injured and almost dislocated, with intermittent numbness in his fingers. Pain severity is significant, causing functional limitations. The current splint is heavy and uncomfortable.    He also mentions an ankle injury from the same incident, describing it as more of a sprain, though a bone was fractured. He reports discomfort from his shoe rubbing against the injured ankle.    He has a history of breaking the same arm 4 times in the past. He recently had a CT of his abdomen performed. He also reports a separate shoulder injury for which he was seeking care.    He stopped taking Percocet after his seventh pill due to adverse effects, including strange feelings, sickness, and headaches.    He denies any history of total knee replacement.    IMAGING:  An XR of the left wrist revealed a severe fracture with displacement, crushed and shortened bones, and evidence of pre-existing wrist arthritis. Mr. Sanabria also underwent a CT of the abdomen.    MEDICATIONS:  Mr. Sanabria started Percocet but discontinued it after 7 pills due to adverse effects including strange feelings, sickness, and headaches.    SOCIAL HISTORY:  Mr. Sanabria is  and approaching his 50th wedding anniversary. He was discharged from  service in 1992 and [facility]. Mr. Sanabria took Percocet but discontinued it due to adverse effects. Information about alcohol use and smoking is not provided.    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 His job duties include physical activity, climbing ladders, and working at heights. Mr. Sanabria's current injury occurred while working, falling from an extension ladder.      ROS:  Head: +headaches  Musculoskeletal: +limb pain  Neurological: +numbness          ROS:  Constitutional: Denies fever chills  Eyes: No change in vision  ENT: No ringing or current infections  CV: No chest pain  Resp: No labored breathing  MSK: Pain evident at site of injury located in HPI,   Integ: No signs of abrasions or lacerations  Neuro: No numbness or tingling  Lymphatic: No swelling outside the area of injury     Medications Ordered Prior to Encounter[1]       Objective:      BP (!) 159/68   Pulse 75   Ht 6' 2" (1.88 m)   Wt 110 kg (242 lb 8.1 oz)   BMI 31.14 kg/m²   General the patient is alert and oriented x3 no acute distress nontoxic-appearing appropriate affect.    Constitutional: Vital signs are examined and stable.  Resp: No signs of labored breathing    LUE: --Skin:  Splint intact.  Able to move fingers           -MSK: STR 5/5 AIN/PIN/Median/Radial/Ulnar motor           -Neuro:  Sensation intact to light touch C5-T1 dermatomes           -Lymphatic: No signs of lymphadenopathy, No signs of swelling,           -CV:Capillary refill is less than 2 seconds. Radial and ulnar pulses 2/4. Compartments Soft and compressible              Body mass index is 31.14 kg/m².  Ideal body weight: 82.2 kg (181 lb 3.5 oz)  Adjusted ideal body weight: 93.3 kg (205 lb 11.7 oz)  No results found for: "HGBA1C"  Hgb   Date Value Ref Range Status   07/19/2025 14.6 14.0 - 18.0 g/dL Final   07/19/2025 15.3 14.0 - 18.0 g/dL Final     No results found for: "[id number]"  WBC   Date Value Ref Range Status   07/19/2025 11.62 (H) 4.50 - 11.50 x10(3)/mcL Final       Radiology:  Three views left wrist skeletally mature individual shows a left severely comminuted distal radius fracture with shortening dorsal angulation and comminution      " "  Assessment:         1. Other closed fracture of distal end of left radius, initial encounter  Ambulatory referral/consult to Orthopedics      2. Closed fracture of proximal end of left ulna, unspecified fracture morphology, initial encounter  Ambulatory referral/consult to Orthopedics      3. Closed avulsion fracture of medial malleolus of right tibia, initial encounter  Ambulatory referral/consult to Orthopedics              Plan:         No follow-ups on file.    Clayton Valenzuela" was seen today for injury.    Diagnoses and all orders for this visit:    Other closed fracture of distal end of left radius, initial encounter  -     Ambulatory referral/consult to Orthopedics    Closed fracture of proximal end of left ulna, unspecified fracture morphology, initial encounter  -     Ambulatory referral/consult to Orthopedics    Closed avulsion fracture of medial malleolus of right tibia, initial encounter  -     Ambulatory referral/consult to Orthopedics      Assessment & Plan    PLAN SUMMARY:  - Proceed with bridge plate surgery  - Consider IV ibuprofen or meloxicam for pain management  - Maintain current splint until surgery if tolerable  - No lifting with affected hand post-surgery; light activities permitted  - Second surgery planned at 3 months to remove bridge plate  - Possible wrist fusion in future if needed  - Follow up after surgery, likely Monday or Tuesday if procedure is on Friday    FOLLOW UP:  - Follow up after surgery, likely Monday or Tuesday if procedure is on Friday.    MEDICATIONS:  - Consider IV ibuprofen or meloxicam for pain.    PROCEDURES:  - Plan to perform bridge plate surgery.  - Mr. Sanabria understands the risks and benefits and elects to proceed with bridge plate surgery.  - Post-op symptoms can include stiff wrist for 3 months.  - Second surgery planned at 3 months to remove the bridge plate.  - Wrist may remain stiff after surgery.  - Possibility of wrist fusion in the future if " needed.    PATIENT INSTRUCTIONS:  - Maintain current splint until surgery if tolerable.  - Avoid using hand for lifting after surgery.  - Can use hand for light activities (e.g., bending elbow) but no lifting post-surgery.          Patient has a severely comminuted distal radius fracture.  With the significant shortening and dorsal angulation and comminution.  Likely lead to chronic long term effects due to the severity of comminution and injury.  He understands this.  We will place him on the OR schedule for Friday for likely bridge plate fixation.  He understands the risks and benefits with the procedure including risks with this specific technique use.    I explained that surgery and the nature of their condition are not without risks. These include, but are not limited to, bleeding, infection, neurovascular compromise, malunion, nonunion, hardware complications, wound complications, scarring, cosmetic defects, need for later and/or repeated surgeries, avascular necrosis, bone death due to initial trauma, pain, loss of ROM, loss of function, PTOA, deformity, stance/gait and/or functional abnormalities, thromboembolic complications, compartment syndrome, loss of limb, loss of life, anesthetic complications, and other imponderables. I explained that these can occur despite the adequacy of treatments rendered, and that their risks are heightened given the nature of their condition.  I have also discussed the importance not using nicotine products due to the increased risk of infection surgical wound healing complications and nonunion of the fracture.  They verbalized understanding.  No guarantees were made.  They would like to continue with surgery at this time. If appropriate family was involved with surgical discussion.       This note/OR report was created with the assistance of  voice recognition software or phone  dictation.  There may be transcription errors as a result of using this technology however  minimal. Effort has been made to assure accuracy of transcription but any obvious errors or omissions should be clarified with the author of the document.     This note was generated with the assistance of ambient listening technology. Verbal consent was obtained by the patient and accompanying visitor(s) for the recording of patient appointment to facilitate this note. I attest to having reviewed and edited the generated note for accuracy, though some syntax or spelling errors may persist. Please contact the author of this note for any clarification.       Armaan Cheema DO  Orthopedic Trauma Surgery  07/23/2025      No future appointments.                [1]   Current Outpatient Medications on File Prior to Visit   Medication Sig Dispense Refill    cholecalciferol, vitamin D3, (VITAMIN D3) 25 mcg (1,000 unit) capsule Take 1,000 Units by mouth every morning.      clopidogreL (PLAVIX) 75 mg tablet Take 75 mg by mouth.      coenzyme Q10 300 mg Cap Take 1 capsule by mouth every evening.      cyanocobalamin (VITAMIN B-12) 100 MCG tablet 1000mg 1 po daily Oral      cyanocobalamin, vitamin B-12, 50 mcg tablet Take 50 mcg by mouth every morning.      oxyCODONE-acetaminophen (PERCOCET) 5-325 mg per tablet Take 1 tablet by mouth every 6 (six) hours as needed for Pain. 16 tablet 0    predniSONE (DELTASONE) 20 MG tablet Take 2 tablet by mouth every six hours start 24 hours prior to procedure **TAKE 2 TABS  EVERY 6 HOURS (12NOON, 5PM, 10PM, 5AM) Oral      rivaroxaban (XARELTO) 20 mg Tab Take 20 mg by mouth.      rosuvastatin (CRESTOR) 20 MG tablet Take 20 mg by mouth.       No current facility-administered medications on file prior to visit.

## 2025-07-23 NOTE — LETTER
Surgery Clearance Request Form  Patients Name: Clayton Sanabria   : 1950  Today's Date: 2025     Dr. Gonzalo Maxwell ,        The above named patient is scheduled to have a open reduction internal fication left wrist  on 2025.    Surgeon: Armaan Cheema MD  Type of Anesthesia: General  Requesting Staff Name: Sailaja Oakes MA    This patient needs surgical clearance from your office for this procedure.    Please perform necessary tests in order for this patient to be medically cleared for surgery.     PLEASE FAX THE CLEARANCE LETTER WITH THE MOST RECENT DIAGNOSTICS AND PROGRESS NOTES TO US AT [phone].  PLEASE BE SURE TO INCLUDE ANY MEDICATION INSTRUCTIONS THAT SHOULD BE HELD PRIOR TO SURGERY.     CHECK ALL THAT APPLY AND COMPLETE THE BLANKS:   [x] Request for cardiac risk assessment for procedure stated above.   (Please fax us the risk assessment/clearance letter with the most recent ECHO, EKG or stress test.)   [x] Medication instructions prior to surgery.   (Please specify if you recommend the patient stop any medications prior to surgery and how many days prior to surgery.)   [] Request for     clearance for the procedure stated above.   (Please fax us the clearance letter with the most recent diagnostics and progress notes.)    We appreciate your help in getting our patient cleared for surgery.    Please feel free to call our office should you have any questions.     Thank you,   Armaan Cheema MD     Phone Number: [phone]  Fax Number: [phone]

## 2025-07-23 NOTE — H&P (VIEW-ONLY)
Subjective:       Patient ID: Clayton Sanabria is a 75 y.o. male.  Chief Complaint   Patient presents with    Left Wrist - Injury     4 days out from left distal radius fx , proximal ulna fx. Present in splint. Right ankle avulsion fx.        HPI:  History of Present Illness    HPI:  Mr. Sanabria presents with a severe left wrist injury from falling off a ladder approximately 4-5 feet high, landing on his outstretched arm. He describes his wrist as severely injured and almost dislocated, with intermittent numbness in his fingers. Pain severity is significant, causing functional limitations. The current splint is heavy and uncomfortable.    He also mentions an ankle injury from the same incident, describing it as more of a sprain, though a bone was fractured. He reports discomfort from his shoe rubbing against the injured ankle.    He has a history of breaking the same arm 4 times in the past. He recently had a CT of his abdomen performed. He also reports a separate shoulder injury for which he was seeking care.    He stopped taking Percocet after his seventh pill due to adverse effects, including strange feelings, sickness, and headaches.    He denies any history of total knee replacement.    IMAGING:  An XR of the left wrist revealed a severe fracture with displacement, crushed and shortened bones, and evidence of pre-existing wrist arthritis. Mr. Sanabria also underwent a CT of the abdomen.    MEDICATIONS:  Mr. Sanabria started Percocet but discontinued it after 7 pills due to adverse effects including strange feelings, sickness, and headaches.    SOCIAL HISTORY:  Mr. Sanabria is  and approaching his 50th wedding anniversary. He was discharged from  service in 1992 and [facility]. Mr. Sanabria took Percocet but discontinued it due to adverse effects. Information about alcohol use and smoking is not provided.    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 His job duties include physical activity, climbing ladders, and working at heights. Mr. Sanabria's current injury occurred while working, falling from an extension ladder.      ROS:  Head: +headaches  Musculoskeletal: +limb pain  Neurological: +numbness          ROS:  Constitutional: Denies fever chills  Eyes: No change in vision  ENT: No ringing or current infections  CV: No chest pain  Resp: No labored breathing  MSK: Pain evident at site of injury located in HPI,   Integ: No signs of abrasions or lacerations  Neuro: No numbness or tingling  Lymphatic: No swelling outside the area of injury     Medications Ordered Prior to Encounter[1]       Objective:      BP (!) 159/68   Pulse 75   Ht 6' 2" (1.88 m)   Wt 110 kg (242 lb 8.1 oz)   BMI 31.14 kg/m²   General the patient is alert and oriented x3 no acute distress nontoxic-appearing appropriate affect.    Constitutional: Vital signs are examined and stable.  Resp: No signs of labored breathing    LUE: --Skin:  Splint intact.  Able to move fingers           -MSK: STR 5/5 AIN/PIN/Median/Radial/Ulnar motor           -Neuro:  Sensation intact to light touch C5-T1 dermatomes           -Lymphatic: No signs of lymphadenopathy, No signs of swelling,           -CV:Capillary refill is less than 2 seconds. Radial and ulnar pulses 2/4. Compartments Soft and compressible              Body mass index is 31.14 kg/m².  Ideal body weight: 82.2 kg (181 lb 3.5 oz)  Adjusted ideal body weight: 93.3 kg (205 lb 11.7 oz)  No results found for: "HGBA1C"  Hgb   Date Value Ref Range Status   07/19/2025 14.6 14.0 - 18.0 g/dL Final   07/19/2025 15.3 14.0 - 18.0 g/dL Final     No results found for: "MSFSKDGC38KE"  WBC   Date Value Ref Range Status   07/19/2025 11.62 (H) 4.50 - 11.50 x10(3)/mcL Final       Radiology:  Three views left wrist skeletally mature individual shows a left severely comminuted distal radius fracture with shortening dorsal angulation and comminution      " "  Assessment:         1. Other closed fracture of distal end of left radius, initial encounter  Ambulatory referral/consult to Orthopedics      2. Closed fracture of proximal end of left ulna, unspecified fracture morphology, initial encounter  Ambulatory referral/consult to Orthopedics      3. Closed avulsion fracture of medial malleolus of right tibia, initial encounter  Ambulatory referral/consult to Orthopedics              Plan:         No follow-ups on file.    Clayton Valenzuela" was seen today for injury.    Diagnoses and all orders for this visit:    Other closed fracture of distal end of left radius, initial encounter  -     Ambulatory referral/consult to Orthopedics    Closed fracture of proximal end of left ulna, unspecified fracture morphology, initial encounter  -     Ambulatory referral/consult to Orthopedics    Closed avulsion fracture of medial malleolus of right tibia, initial encounter  -     Ambulatory referral/consult to Orthopedics      Assessment & Plan    PLAN SUMMARY:  - Proceed with bridge plate surgery  - Consider IV ibuprofen or meloxicam for pain management  - Maintain current splint until surgery if tolerable  - No lifting with affected hand post-surgery; light activities permitted  - Second surgery planned at 3 months to remove bridge plate  - Possible wrist fusion in future if needed  - Follow up after surgery, likely Monday or Tuesday if procedure is on Friday    FOLLOW UP:  - Follow up after surgery, likely Monday or Tuesday if procedure is on Friday.    MEDICATIONS:  - Consider IV ibuprofen or meloxicam for pain.    PROCEDURES:  - Plan to perform bridge plate surgery.  - Mr. Sanabria understands the risks and benefits and elects to proceed with bridge plate surgery.  - Post-op symptoms can include stiff wrist for 3 months.  - Second surgery planned at 3 months to remove the bridge plate.  - Wrist may remain stiff after surgery.  - Possibility of wrist fusion in the future if " needed.    PATIENT INSTRUCTIONS:  - Maintain current splint until surgery if tolerable.  - Avoid using hand for lifting after surgery.  - Can use hand for light activities (e.g., bending elbow) but no lifting post-surgery.          Patient has a severely comminuted distal radius fracture.  With the significant shortening and dorsal angulation and comminution.  Likely lead to chronic long term effects due to the severity of comminution and injury.  He understands this.  We will place him on the OR schedule for Friday for likely bridge plate fixation.  He understands the risks and benefits with the procedure including risks with this specific technique use.    I explained that surgery and the nature of their condition are not without risks. These include, but are not limited to, bleeding, infection, neurovascular compromise, malunion, nonunion, hardware complications, wound complications, scarring, cosmetic defects, need for later and/or repeated surgeries, avascular necrosis, bone death due to initial trauma, pain, loss of ROM, loss of function, PTOA, deformity, stance/gait and/or functional abnormalities, thromboembolic complications, compartment syndrome, loss of limb, loss of life, anesthetic complications, and other imponderables. I explained that these can occur despite the adequacy of treatments rendered, and that their risks are heightened given the nature of their condition.  I have also discussed the importance not using nicotine products due to the increased risk of infection surgical wound healing complications and nonunion of the fracture.  They verbalized understanding.  No guarantees were made.  They would like to continue with surgery at this time. If appropriate family was involved with surgical discussion.       This note/OR report was created with the assistance of  voice recognition software or phone  dictation.  There may be transcription errors as a result of using this technology however  minimal. Effort has been made to assure accuracy of transcription but any obvious errors or omissions should be clarified with the author of the document.     This note was generated with the assistance of ambient listening technology. Verbal consent was obtained by the patient and accompanying visitor(s) for the recording of patient appointment to facilitate this note. I attest to having reviewed and edited the generated note for accuracy, though some syntax or spelling errors may persist. Please contact the author of this note for any clarification.       Armaan Cheema DO  Orthopedic Trauma Surgery  07/23/2025      No future appointments.                [1]   Current Outpatient Medications on File Prior to Visit   Medication Sig Dispense Refill    cholecalciferol, vitamin D3, (VITAMIN D3) 25 mcg (1,000 unit) capsule Take 1,000 Units by mouth every morning.      clopidogreL (PLAVIX) 75 mg tablet Take 75 mg by mouth.      coenzyme Q10 300 mg Cap Take 1 capsule by mouth every evening.      cyanocobalamin (VITAMIN B-12) 100 MCG tablet 1000mg 1 po daily Oral      cyanocobalamin, vitamin B-12, 50 mcg tablet Take 50 mcg by mouth every morning.      oxyCODONE-acetaminophen (PERCOCET) 5-325 mg per tablet Take 1 tablet by mouth every 6 (six) hours as needed for Pain. 16 tablet 0    predniSONE (DELTASONE) 20 MG tablet Take 2 tablet by mouth every six hours start 24 hours prior to procedure **TAKE 2 TABS  EVERY 6 HOURS (12NOON, 5PM, 10PM, 5AM) Oral      rivaroxaban (XARELTO) 20 mg Tab Take 20 mg by mouth.      rosuvastatin (CRESTOR) 20 MG tablet Take 20 mg by mouth.       No current facility-administered medications on file prior to visit.

## 2025-07-24 RX ORDER — METOPROLOL TARTRATE 25 MG/1
12.5 TABLET, FILM COATED ORAL DAILY
COMMUNITY

## 2025-07-24 RX ORDER — ACETAMINOPHEN 500 MG
500 TABLET ORAL 2 TIMES DAILY
COMMUNITY

## 2025-07-24 NOTE — CLINICAL REVIEW
"labs, EKG reviewed. Awaiting Xarelto stop/hold. "Not taking Plavix" documented. sd //     Xarelto to continue. Cardiology  notes utd. chart review complete. ccv   "

## 2025-07-25 ENCOUNTER — ANESTHESIA (OUTPATIENT)
Dept: SURGERY | Facility: HOSPITAL | Age: 75
End: 2025-07-25
Payer: MEDICARE

## 2025-07-25 ENCOUNTER — HOSPITAL ENCOUNTER (OUTPATIENT)
Facility: HOSPITAL | Age: 75
Discharge: HOME | End: 2025-07-25
Attending: ORTHOPAEDIC SURGERY | Admitting: ORTHOPAEDIC SURGERY
Payer: MEDICARE

## 2025-07-25 VITALS
DIASTOLIC BLOOD PRESSURE: 55 MMHG | HEART RATE: 60 BPM | TEMPERATURE: 98 F | HEIGHT: 74 IN | OXYGEN SATURATION: 100 % | SYSTOLIC BLOOD PRESSURE: 117 MMHG | RESPIRATION RATE: 17 BRPM | WEIGHT: 242 LBS | BODY MASS INDEX: 31.06 KG/M2

## 2025-07-25 DIAGNOSIS — S52.592A OTHER CLOSED FRACTURE OF DISTAL END OF LEFT RADIUS, INITIAL ENCOUNTER: Primary | ICD-10-CM

## 2025-07-25 PROBLEM — S52.502A CLOSED FRACTURE OF LEFT DISTAL RADIUS: Status: ACTIVE | Noted: 2025-07-25

## 2025-07-25 PROCEDURE — 64415 NJX AA&/STRD BRCH PLXS IMG: CPT | Performed by: ANESTHESIOLOGY

## 2025-07-25 PROCEDURE — 63600175 PHARM REV CODE 636 W HCPCS

## 2025-07-25 PROCEDURE — 36000711: Performed by: ORTHOPAEDIC SURGERY

## 2025-07-25 PROCEDURE — 63600175 PHARM REV CODE 636 W HCPCS: Performed by: ORTHOPAEDIC SURGERY

## 2025-07-25 PROCEDURE — 71000033 HC RECOVERY, INTIAL HOUR: Performed by: ORTHOPAEDIC SURGERY

## 2025-07-25 PROCEDURE — 63600175 PHARM REV CODE 636 W HCPCS: Performed by: ANESTHESIOLOGY

## 2025-07-25 PROCEDURE — 25000003 PHARM REV CODE 250

## 2025-07-25 PROCEDURE — 71000016 HC POSTOP RECOV ADDL HR: Performed by: ORTHOPAEDIC SURGERY

## 2025-07-25 PROCEDURE — 27201423 OPTIME MED/SURG SUP & DEVICES STERILE SUPPLY: Performed by: ORTHOPAEDIC SURGERY

## 2025-07-25 PROCEDURE — 37000008 HC ANESTHESIA 1ST 15 MINUTES: Performed by: ORTHOPAEDIC SURGERY

## 2025-07-25 PROCEDURE — 36000710: Performed by: ORTHOPAEDIC SURGERY

## 2025-07-25 PROCEDURE — 37000009 HC ANESTHESIA EA ADD 15 MINS: Performed by: ORTHOPAEDIC SURGERY

## 2025-07-25 PROCEDURE — C1713 ANCHOR/SCREW BN/BN,TIS/BN: HCPCS | Performed by: ORTHOPAEDIC SURGERY

## 2025-07-25 PROCEDURE — 25609 OPTX DST RD XART FX/EP SEP3+: CPT | Mod: AS,LT,, | Performed by: PHYSICIAN ASSISTANT

## 2025-07-25 PROCEDURE — 71000015 HC POSTOP RECOV 1ST HR: Performed by: ORTHOPAEDIC SURGERY

## 2025-07-25 PROCEDURE — 25000003 PHARM REV CODE 250: Performed by: ORTHOPAEDIC SURGERY

## 2025-07-25 PROCEDURE — 25609 OPTX DST RD XART FX/EP SEP3+: CPT | Mod: LT,,, | Performed by: ORTHOPAEDIC SURGERY

## 2025-07-25 DEVICE — SCREW BONE TI LOCK 12X3MM: Type: IMPLANTABLE DEVICE | Site: RADIUS | Status: FUNCTIONAL

## 2025-07-25 DEVICE — PLATE GEMINUS DORSAL SPANNING: Type: IMPLANTABLE DEVICE | Site: RADIUS | Status: FUNCTIONAL

## 2025-07-25 DEVICE — IMPLANTABLE DEVICE: Type: IMPLANTABLE DEVICE | Site: RADIUS | Status: FUNCTIONAL

## 2025-07-25 DEVICE — SCREW BONE COMPR 12X3MM: Type: IMPLANTABLE DEVICE | Site: RADIUS | Status: FUNCTIONAL

## 2025-07-25 DEVICE — SCREW BONE LOCK TI 10X3MM: Type: IMPLANTABLE DEVICE | Site: RADIUS | Status: FUNCTIONAL

## 2025-07-25 RX ORDER — PROPOFOL 10 MG/ML
INJECTION, EMULSION INTRAVENOUS
Status: DISCONTINUED | OUTPATIENT
Start: 2025-07-25 | End: 2025-07-25

## 2025-07-25 RX ORDER — KETOROLAC TROMETHAMINE 30 MG/ML
15 INJECTION, SOLUTION INTRAMUSCULAR; INTRAVENOUS EVERY 8 HOURS PRN
Status: DISCONTINUED | OUTPATIENT
Start: 2025-07-25 | End: 2025-07-25 | Stop reason: HOSPADM

## 2025-07-25 RX ORDER — GLUCAGON 1 MG
1 KIT INJECTION
Status: DISCONTINUED | OUTPATIENT
Start: 2025-07-25 | End: 2025-07-25 | Stop reason: HOSPADM

## 2025-07-25 RX ORDER — TIZANIDINE 4 MG/1
4 TABLET ORAL EVERY 8 HOURS PRN
Status: DISCONTINUED | OUTPATIENT
Start: 2025-07-25 | End: 2025-07-25 | Stop reason: HOSPADM

## 2025-07-25 RX ORDER — MUPIROCIN 20 MG/G
OINTMENT TOPICAL
Status: DISCONTINUED | OUTPATIENT
Start: 2025-07-25 | End: 2025-07-25 | Stop reason: HOSPADM

## 2025-07-25 RX ORDER — HYDROCODONE BITARTRATE AND ACETAMINOPHEN 10; 325 MG/1; MG/1
1 TABLET ORAL EVERY 4 HOURS PRN
Qty: 42 TABLET | Refills: 0 | Status: SHIPPED | OUTPATIENT
Start: 2025-07-25 | End: 2025-08-01

## 2025-07-25 RX ORDER — ROPIVACAINE HYDROCHLORIDE 5 MG/ML
30 INJECTION, SOLUTION EPIDURAL; INFILTRATION; PERINEURAL ONCE
Status: COMPLETED | OUTPATIENT
Start: 2025-07-25 | End: 2025-07-25

## 2025-07-25 RX ORDER — HALOPERIDOL LACTATE 5 MG/ML
0.5 INJECTION, SOLUTION INTRAMUSCULAR EVERY 10 MIN PRN
Status: DISCONTINUED | OUTPATIENT
Start: 2025-07-25 | End: 2025-07-25 | Stop reason: HOSPADM

## 2025-07-25 RX ORDER — ONDANSETRON 4 MG/1
4 TABLET, ORALLY DISINTEGRATING ORAL EVERY 8 HOURS PRN
Qty: 18 TABLET | Refills: 0 | Status: SHIPPED | OUTPATIENT
Start: 2025-07-25 | End: 2025-07-31

## 2025-07-25 RX ORDER — HYDROMORPHONE HYDROCHLORIDE 2 MG/ML
0.4 INJECTION, SOLUTION INTRAMUSCULAR; INTRAVENOUS; SUBCUTANEOUS EVERY 5 MIN PRN
Status: DISCONTINUED | OUTPATIENT
Start: 2025-07-25 | End: 2025-07-25 | Stop reason: HOSPADM

## 2025-07-25 RX ORDER — DEXAMETHASONE SODIUM PHOSPHATE 4 MG/ML
INJECTION, SOLUTION INTRA-ARTICULAR; INTRALESIONAL; INTRAMUSCULAR; INTRAVENOUS; SOFT TISSUE
Status: DISCONTINUED | OUTPATIENT
Start: 2025-07-25 | End: 2025-07-25

## 2025-07-25 RX ORDER — MORPHINE SULFATE 4 MG/ML
4 INJECTION, SOLUTION INTRAMUSCULAR; INTRAVENOUS EVERY 6 HOURS PRN
Refills: 0 | Status: DISCONTINUED | OUTPATIENT
Start: 2025-07-25 | End: 2025-07-25 | Stop reason: HOSPADM

## 2025-07-25 RX ORDER — MIDAZOLAM HYDROCHLORIDE 2 MG/2ML
INJECTION, SOLUTION INTRAMUSCULAR; INTRAVENOUS
Status: COMPLETED
Start: 2025-07-25 | End: 2025-07-25

## 2025-07-25 RX ORDER — ONDANSETRON 4 MG/1
4 TABLET, ORALLY DISINTEGRATING ORAL EVERY 8 HOURS PRN
Status: DISCONTINUED | OUTPATIENT
Start: 2025-07-25 | End: 2025-07-25 | Stop reason: HOSPADM

## 2025-07-25 RX ORDER — ONDANSETRON HYDROCHLORIDE 2 MG/ML
INJECTION, SOLUTION INTRAVENOUS
Status: DISCONTINUED | OUTPATIENT
Start: 2025-07-25 | End: 2025-07-25

## 2025-07-25 RX ORDER — SODIUM CHLORIDE 0.9 % (FLUSH) 0.9 %
10 SYRINGE (ML) INJECTION
Status: DISCONTINUED | OUTPATIENT
Start: 2025-07-25 | End: 2025-07-25 | Stop reason: HOSPADM

## 2025-07-25 RX ORDER — FENTANYL CITRATE 50 UG/ML
INJECTION, SOLUTION INTRAMUSCULAR; INTRAVENOUS
Status: DISCONTINUED | OUTPATIENT
Start: 2025-07-25 | End: 2025-07-25

## 2025-07-25 RX ORDER — ROPIVACAINE HYDROCHLORIDE 5 MG/ML
INJECTION, SOLUTION EPIDURAL; INFILTRATION; PERINEURAL
Status: COMPLETED | OUTPATIENT
Start: 2025-07-25 | End: 2025-07-25

## 2025-07-25 RX ORDER — CLINDAMYCIN PHOSPHATE 900 MG/50ML
900 INJECTION, SOLUTION INTRAVENOUS
Status: DISCONTINUED | OUTPATIENT
Start: 2025-07-25 | End: 2025-07-25

## 2025-07-25 RX ORDER — OXYCODONE HYDROCHLORIDE 5 MG/1
5 TABLET ORAL EVERY 6 HOURS PRN
Refills: 0 | Status: DISCONTINUED | OUTPATIENT
Start: 2025-07-25 | End: 2025-07-25 | Stop reason: HOSPADM

## 2025-07-25 RX ORDER — OXYCODONE HYDROCHLORIDE 5 MG/1
10 TABLET ORAL EVERY 4 HOURS PRN
Refills: 0 | Status: DISCONTINUED | OUTPATIENT
Start: 2025-07-25 | End: 2025-07-25 | Stop reason: HOSPADM

## 2025-07-25 RX ORDER — VANCOMYCIN HYDROCHLORIDE 1 G/20ML
INJECTION, POWDER, LYOPHILIZED, FOR SOLUTION INTRAVENOUS
Status: DISCONTINUED | OUTPATIENT
Start: 2025-07-25 | End: 2025-07-25 | Stop reason: HOSPADM

## 2025-07-25 RX ORDER — HYDRALAZINE HYDROCHLORIDE 20 MG/ML
10 INJECTION INTRAMUSCULAR; INTRAVENOUS ONCE
Status: DISCONTINUED | OUTPATIENT
Start: 2025-07-25 | End: 2025-07-25 | Stop reason: HOSPADM

## 2025-07-25 RX ORDER — ENOXAPARIN SODIUM 100 MG/ML
40 INJECTION SUBCUTANEOUS EVERY 24 HOURS
Status: DISCONTINUED | OUTPATIENT
Start: 2025-07-25 | End: 2025-07-25

## 2025-07-25 RX ORDER — EPHEDRINE SULFATE 50 MG/ML
INJECTION, SOLUTION INTRAVENOUS
Status: DISCONTINUED | OUTPATIENT
Start: 2025-07-25 | End: 2025-07-25

## 2025-07-25 RX ORDER — CLINDAMYCIN PHOSPHATE 900 MG/50ML
900 INJECTION, SOLUTION INTRAVENOUS
Status: DISCONTINUED | OUTPATIENT
Start: 2025-07-25 | End: 2025-07-25 | Stop reason: HOSPADM

## 2025-07-25 RX ORDER — MIDAZOLAM HYDROCHLORIDE 1 MG/ML
2 INJECTION INTRAMUSCULAR; INTRAVENOUS ONCE
Status: COMPLETED | OUTPATIENT
Start: 2025-07-25 | End: 2025-07-25

## 2025-07-25 RX ORDER — LIDOCAINE HYDROCHLORIDE 20 MG/ML
INJECTION INTRAVENOUS
Status: DISCONTINUED | OUTPATIENT
Start: 2025-07-25 | End: 2025-07-25

## 2025-07-25 RX ADMIN — DEXAMETHASONE SODIUM PHOSPHATE 4 MG: 4 INJECTION, SOLUTION INTRA-ARTICULAR; INTRALESIONAL; INTRAMUSCULAR; INTRAVENOUS; SOFT TISSUE at 10:07

## 2025-07-25 RX ADMIN — EPHEDRINE SULFATE 10 MG: 50 INJECTION INTRAVENOUS at 10:07

## 2025-07-25 RX ADMIN — LIDOCAINE HYDROCHLORIDE 80 MG: 20 INJECTION INTRAVENOUS at 10:07

## 2025-07-25 RX ADMIN — EPHEDRINE SULFATE 5 MG: 50 INJECTION INTRAVENOUS at 10:07

## 2025-07-25 RX ADMIN — PROPOFOL 180 MG: 10 INJECTION, EMULSION INTRAVENOUS at 10:07

## 2025-07-25 RX ADMIN — SODIUM CHLORIDE, SODIUM GLUCONATE, SODIUM ACETATE, POTASSIUM CHLORIDE AND MAGNESIUM CHLORIDE: 526; 502; 368; 37; 30 INJECTION, SOLUTION INTRAVENOUS at 10:07

## 2025-07-25 RX ADMIN — FENTANYL CITRATE 25 MCG: 50 INJECTION, SOLUTION INTRAMUSCULAR; INTRAVENOUS at 11:07

## 2025-07-25 RX ADMIN — MIDAZOLAM HYDROCHLORIDE 2 MG: 1 INJECTION, SOLUTION INTRAMUSCULAR; INTRAVENOUS at 09:07

## 2025-07-25 RX ADMIN — MUPIROCIN: 20 OINTMENT TOPICAL at 05:07

## 2025-07-25 RX ADMIN — MIDAZOLAM HYDROCHLORIDE 2 MG: 1 INJECTION INTRAMUSCULAR; INTRAVENOUS at 09:07

## 2025-07-25 RX ADMIN — ROPIVACAINE HYDROCHLORIDE 30 ML: 5 INJECTION, SOLUTION EPIDURAL; INFILTRATION; PERINEURAL at 09:07

## 2025-07-25 RX ADMIN — FENTANYL CITRATE 25 MCG: 50 INJECTION, SOLUTION INTRAMUSCULAR; INTRAVENOUS at 10:07

## 2025-07-25 RX ADMIN — CLINDAMYCIN PHOSPHATE 900 MG: 900 INJECTION, SOLUTION INTRAVENOUS at 10:07

## 2025-07-25 RX ADMIN — ONDANSETRON 4 MG: 2 INJECTION INTRAMUSCULAR; INTRAVENOUS at 10:07

## 2025-07-25 NOTE — OP NOTE
OPERATIVE REPORT    Patient: Clayton Sanabria Jr.   : 1950    MRN: [id number]  Date: 2025      Surgeon:Armaan Cheema DO  Assistant: Jose De Jesus Harris was essential, part of the procedure including deep hardware placement and deep closure.  No senior assistant was availible  Preoperative Diagnosis:Left severely comminuted distal radius fracture   Postoperative Diagnosis: Same  Procedure:   1) Open reduction and internal fixation of 4+ part intra-articular left distal radius fracture  Anesthesiologist: Zulma Dickens MD  OR Staff: Circulator: Yaw Spears RN  Physician Assistant: Lucy Harris PA-C  Scrub Person: Kenneth Ivey ST  Implants:   Implant Name Type Inv. Item Serial No.  Lot No. LRB No. Used Action   PLATE GEMINUS DORSAL SPANNING - [id number]  PLATE GEMINUS DORSAL SPANNING  [employer]  Left 1 Implanted   SCREW BONE LOCK TI 10X3MM - [id number]  SCREW BONE LOCK TI 10X3MM  [employer]  Left 1 Implanted   SCREW BONE TI LOCK 12X3MM - [id number]  SCREW BONE TI LOCK 12X3MM  [employer]  Left 1 Implanted   SCREW DORSAL MT LAUREL 3.0X14MM - [id number]  SCREW DORSAL MT LAUREL 3.0X14MM  [employer]  Left 3 Implanted   SCREW DORSAL MT LAUREL 3.0X16MM - [id number]  SCREW DORSAL MT LAUREL 3.0X16MM  [employer]  Left 1 Implanted   SCREW BONE COMPR 12X3MM - [id number]  SCREW BONE COMPR 12X3MM  [employer]  Left 1 Implanted   SCREW MULTI THRD COMP 3.0X16MM - [id number]  SCREW MULTI THRD COMP 3.0X16MM  [employer]  Left 1 Implanted     EBL: 20cc  Complications: None  Disposition: To PACU, stable    Indications: Clayton Sanabria Jr. is a 75 y.o. male presenting with the aforementioned injuries. The procedure is indicated to obtain and maintain stability of the wrist and allow early ROM.  The patient is awake and alert. After thorough discussion of the risks, benefits, expected outcomes, and alternatives to surgical  intervention, the patient agreed to proceed with surgical treatment.  Specific risks discussed included, but were not limited to: superficial or deep infection, wound healing complications, DVT/PE, significant bleeding requiring transfusion, damage to named anatomic structures in the immediate area including named neurovascular structures, carpal tunnel syndrome, implant failure, and general risks of anesthesia.  Patient will have hardware removed at 3-4 months to prevent hardware failure.  The patient voiced understanding and written as well as verbal consent was obtained by myself prior to the procedure.    Pt has severely comminuted distal radius fracture. Greater than 5 fragments. Very much increased risk of complications and functional loss. This was discussed with the patient in detail.    Procedure Note:  The patient was brought back to the OR and placed supine on the OR table. After successful induction of anesthesia by anesthesia staff, the patient was positioned in the supine position and all bony prominences were padded appropriately.  The surgical field was then provisionally cleansed and then prepped and draped in the usual sterile fashion.    At this time a time-out was performed, with the correct patient, site, and procedure identified.  The universal time out as well as sign your site protocols were followed.  Preoperative antibiotics were verified as administered.     A standard dorsal approach was used for the distal radius.  I marked out using the bridge plate as a guide on the   Second metacarpal  using XR to guide my reduction with traction.      I then used a 15 blade to make the incision over the area marked out over the dorsal bridge plate.  The bony dissected down to the metacarpal and to the radius.  I then slid the correct size dorsal bridge plate in the place beneath the tendon structures.  All neurovascular structures protected.  We then completed the reduction and I locked the proximal  aspect of the plate using a nonlocking screw.  Following that I then pulled traction and completed the reduction to the correct metacarpal as stated above and place a combination of locking and nonlocking screws.          Final C-arm images were obtained and saved to the [employer] system.  The 4+ part intra-articular fracture was reduced and held nicely.  No additional implants were needed for stabilization of the distal radius.    The incisions were then irrigated using copious sterile saline and then closed in layered fashion.  The surgical sites were infiltrated using local anesthetic, and the arm was sterilely cleansed and dressed.    The patient was then subsequently extubated and transferred to to PACU in a stable condition.    All sponge and needle counts were correct at the end of the case.  I was present and participated in all aspects of the procedure.    Prognosis:  We will keep the patient NWB for now in a short arm splint for 12 weeks with plan to remove plate at 3 -4months .  The patient can do ROM of the fingers and elbow as tolerated.  Pt may need volar plating on return.      This note/OR report was created with the assistance of  voice recognition software or phone  dictation.  There may be transcription errors as a result of using this technology however minimal. Effort has been made to assure accuracy of transcription but any obvious errors or omissions should be clarified with the author of the document.       Armaan Cheema, DO  Orthopedic Trauma Surgery

## 2025-07-25 NOTE — INTERVAL H&P NOTE
The patient has been examined and the H&P has been reviewed:    I concur with the findings and no changes have occurred since H&P was written.    Surgery risks, benefits and alternative options discussed and understood by patient/family.    Discussed the risks of surgery with the cardiology.  We will proceed with bridge plate fixation due to his severe wrist fracture    I explained that surgery and the nature of their condition are not without risks. These include, but are not limited to, bleeding, infection, neurovascular compromise, malunion, nonunion, hardware complications, wound complications, scarring, cosmetic defects, need for later and/or repeated surgeries, avascular necrosis, bone death due to initial trauma, pain, loss of ROM, loss of function, PTOA, deformity, stance/gait and/or functional abnormalities, thromboembolic complications, compartment syndrome, loss of limb, loss of life, anesthetic complications, and other imponderables. I explained that these can occur despite the adequacy of treatments rendered, and that their risks are heightened given the nature of their condition.  I have also discussed the importance not using nicotine products due to the increased risk of infection surgical wound healing complications and nonunion of the fracture.  They verbalized understanding.  No guarantees were made.  They would like to continue with surgery at this time. If appropriate family was involved with surgical discussion.     This note/OR report was created with the assistance of  voice recognition software or phone  dictation.  There may be transcription errors as a result of using this technology however minimal. Effort has been made to assure accuracy of transcription but any obvious errors or omissions should be clarified with the author of the document.       Armaan Cheema, DO  Orthopedic Trauma Surgery       There are no hospital problems to display for this patient.

## 2025-07-25 NOTE — ANESTHESIA PROCEDURE NOTES
Peripheral Block    Patient location during procedure: pre-op   Block not for primary anesthetic.  Reason for block: at surgeon's request and post-op pain management   Post-op Pain Location: L Wrist Pain   Start time: 7/25/2025 9:47 AM  Timeout: 7/25/2025 9:43 AM   End time: 7/25/2025 9:50 AM    Staffing  Authorizing Provider: Bib Craft MD  Performing Provider: Bib Craft MD    Staffing  Performed by: Bib Craft MD  Authorized by: Bib Craft MD    Preanesthetic Checklist  Completed: patient identified, IV checked, site marked, risks and benefits discussed, surgical consent, monitors and equipment checked, pre-op evaluation and timeout performed  Peripheral Block  Patient position: supine  Prep: ChloraPrep  Patient monitoring: heart rate, continuous pulse ox and frequent blood pressure checks  Block type: infraclavicular  Laterality: left  Injection technique: single shot  Needle  Needle type: Stimuplex   Needle gauge: 22 G  Needle length: 4 in  Needle localization: anatomical landmarks, ultrasound guidance and nerve stimulator  Needle insertion depth: 3 cm   -ultrasound image captured on disc.  Assessment  Injection assessment: negative aspiration, negative parasthesia and local visualized surrounding nerve  Paresthesia pain: none  Heart rate change: no  Slow fractionated injection: yes  Pain Tolerance: comfortable throughout block and no complaints  Medications:    Medications: ropivacaine (NAROPIN) injection 0.5% - Perineural   30 mL - 7/25/2025 9:49:00 AM    Additional Notes  VSS.  DOS[facility] RN monitoring vitals throughout procedure.  Patient tolerated procedure well.

## 2025-07-25 NOTE — DISCHARGE SUMMARY
[facility] Periop Services  Discharge Note  Short Stay    Procedure(s) (LRB):  ORIF, FRACTURE, RADIUS, DISTAL (Left)      OUTCOME: Patient tolerated treatment/procedure well without complication and is now ready for discharge.    DISPOSITION: Home or Self Care    FINAL DIAGNOSIS:  Closed fracture of left distal radius    FOLLOWUP: In clinic    DISCHARGE INSTRUCTIONS:    Discharge Procedure Orders   Notify your health care provider if you experience any of the following:  temperature >100.4     Notify your health care provider if you experience any of the following:  persistent nausea and vomiting or diarrhea     Notify your health care provider if you experience any of the following:  severe uncontrolled pain     Notify your health care provider if you experience any of the following:  redness, tenderness, or signs of infection (pain, swelling, redness, odor or green/yellow discharge around incision site)     Remove dressing in 48 hours   Order Comments: Begin daily dry dressing changes POD2. May cover with soft dressing or large band aid. Keep clean and dry. No showers to POD 7. Do not submerge. Do not apply ointments or creams.     Weight bearing restrictions (specify):   Order Comments: NWgianna MURRAY wrist         Medication List        START taking these medications      HYDROcodone-acetaminophen  mg per tablet  Commonly known as: NORCO  Take 1 tablet by mouth every 4 (four) hours as needed for Pain.     ondansetron 4 MG Tbdl  Commonly known as: ZOFRAN-ODT  Take 1 tablet (4 mg total) by mouth every 8 (eight) hours as needed (nausea).            CONTINUE taking these medications      acetaminophen 500 MG tablet  Commonly known as: TYLENOL     coenzyme Q10 300 mg Cap     metoprolol tartrate 25 MG tablet  Commonly known as: LOPRESSOR     PRESERVISION AREDS ORAL     rivaroxaban 20 mg Tab  Commonly known as: XARELTO     rosuvastatin 20 MG tablet  Commonly known as: CRESTOR            STOP  taking these medications      cholecalciferol (vitamin D3) 25 mcg (1,000 unit) capsule  Commonly known as: VITAMIN D3     clopidogreL 75 mg tablet  Commonly known as: PLAVIX     cyanocobalamin (vitamin B-12) 50 mcg tablet     cyanocobalamin 100 MCG tablet  Commonly known as: VITAMIN B-12     oxyCODONE-acetaminophen 5-325 mg per tablet  Commonly known as: PERCOCET     predniSONE 20 MG tablet  Commonly known as: DELTASONE               Where to Get Your Medications        These medications were sent to [facility] - [address]  [address] [place] [address] Floor [address] [address]      Phone: [phone]   HYDROcodone-acetaminophen  mg per tablet  ondansetron 4 MG [facility]           TIME SPENT ON DISCHARGE: 15 minutes    Alexandra Blair Lemaire, PA-C [facility]   Orthopedic Trauma

## 2025-07-25 NOTE — ANESTHESIA PROCEDURE NOTES
Intubation    Date/Time: 7/25/2025 10:31 AM    Performed by: Iram Eason CRNA  Authorized by: Zulma Dickens MD    Intubation:     Induction:  Intravenous    Intubated:  Postinduction    Mask Ventilation:  Easy mask    Attempts:  1    Attempted By:  CRNA    Difficult Airway Encountered?: No      Airway Device:  Supraglottic airway/LMA    Airway Device Size:  4.0    Style/Cuff Inflation:  Cuffed (inflated to minimal occlusive pressure)    Secured at:  The lips    Placement Verified By:  Capnometry    Complicating Factors:  None    Findings Post-Intubation:  BS equal bilateral and atraumatic/condition of teeth unchanged

## 2025-07-25 NOTE — ANESTHESIA PREPROCEDURE EVALUATION
"                                                                                                             07/25/2025  Clayton Sanabria Jr. is a 75 y.o., male presents with a severe left wrist injury from falling off a ladder approximately 4-5 feet high, landing on his outstretched arm. He describes his wrist as severely injured and almost dislocated, with intermittent numbness in his fingers. Pain severity is significant, causing functional limitations. The current splint is heavy and uncomfortable.     He also mentions an ankle injury from the same incident, describing it as more of a sprain, though a bone was fractured. He reports discomfort from his shoe rubbing against the injured ankle.     He has a history of breaking the same arm 4 times in the past. He recently had a CT of his abdomen performed. He also reports a separate shoulder injury for which he was seeking care.     He had an RCA DOMINIC placed about one year ago, and has been taken off of his Plavix at this time, but he is still taking Eliquis for his A-Fib.     Height: 6' 2" (1.88 m) (07/25/25)   Weight: 109.8 kg (242 lb) (07/24/25)   BMI: 31.07   NPO Status: 1200   Allergies: IODINE, AMITRIPTYLINE, AMLODIPINE, CLOPIDOGREL, CODEINE, DIGOXIN, FINASTERIDE, LOVASTATIN, METHADONE, PRAVASTATIN, TRAMADOL, METHOCARBAMOL, PENICILLINS, PERCOCET [OXYCODONE-ACETAMINOPHEN]     Pre Vitals  Current as of 07/25/25 0612  BP: 137/66 Pulse: 61   Resp: 20 SpO2: 96   Temp: 36.8 °C (98.3 °F)     Chronic back pain    Past Medical History   Hypertension High cholesterol   Obesity History of kidney stones   Other closed fracture of distal end of left radius, initial encounter Uses roller walker   Ankle fracture Atrial fibrillation   Personal history of fall Sleep apnea   Coronary artery disease Digestive disorder   Arthritis      Surgical History    NECK SURGERY bypass surgery   APPENDECTOMY HERNIA REPAIR   KIDNEY STONE SURGERY CORONARY STENT PLACEMENT   MANDIBLE SURGERY " CARDIAC CATHETERIZATION     Prescription Medications  Within last 14 days from 07/25/25   Last Taken Last Updated   acetaminophen (TYLENOL) 500 MG tablet    Past Week 07/25/25 0537   cholecalciferol, vitamin D3, (VITAMIN D3) 25 mcg (1,000 unit) capsule        clopidogreL (PLAVIX) 75 mg tablet        coenzyme Q10 300 mg Cap    7/24/2025 07/25/25 0537   cyanocobalamin (VITAMIN B-12) 100 MCG tablet        cyanocobalamin, vitamin B-12, 50 mcg tablet        metoprolol tartrate (LOPRESSOR) 25 MG tablet    7/25/2025 at Morning 07/25/25 0537   oxyCODONE-acetaminophen (PERCOCET) 5-325 mg per tablet        predniSONE (DELTASONE) 20 MG tablet        rivaroxaban (XARELTO) 20 mg Tab    7/25/2025 at  5:30 AM 07/25/25 0537   rosuvastatin (CRESTOR) 20 MG tablet    7/24/2025 07/25/25 0537   vit A/vit C/vit E/zinc/copper (PRESERVISION AREDS ORAL)    7/24/2025 07/25/25 0537         Latest Reference Range & Units 07/19/25 14:30   WBC 4.50 - 11.50 x10(3)/mcL 11.62 (H)   RBC 4.70 - 6.10 x10(6)/mcL 4.62 (L)   Hemoglobin 14.0 - 18.0 g/dL 15.3   Hematocrit 42.0 - 52.0 % 45.4   Platelet Count 130 - 400 x10(3)/mcL 209   PT 11.7 - 14.5 seconds 22.1 (H)   INR <=1.3  1.9 (H)   PTT 23.4 - 33.9 seconds 37.9 (H)   Sodium 136 - 145 mmol/L 141   Potassium 3.5 - 5.1 mmol/L 3.8   Chloride 98 - 107 mmol/L 107   CO2 23 - 31 mmol/L 25   Anion Gap mEq/L 9.0   BUN 8.4 - 25.7 mg/dL 14.2   Creatinine 0.72 - 1.25 mg/dL 1.08   BUN/CREAT RATIO  13   eGFR mL/min/1.73/m2 >60   Glucose 82 - 115 mg/dL 97   Calcium 8.8 - 10.0 mg/dL 9.3   [facility] 9/27/24   Impression     Native triple-vessel disease with patent LIMA to LAD and vein  graft to  circumflex, status post successful stent of the right coronary artery.  EKG 7/19/25   Sinus bradycardia         Pre-op Assessment    I have reviewed the Patient Summary Reports.     I have reviewed the Nursing Notes. I have reviewed the NPO Status.   I have reviewed the Medications.     Review of Systems  Anesthesia Hx:  No problems  with previous Anesthesia   History of prior surgery of interest to airway management or planning: cervical fusion. Previous anesthesia: General        Denies Family Hx of Anesthesia complications.    Denies Personal Hx of Anesthesia complications.                    Social:  Non-Smoker, Social Alcohol Use       Hematology/Oncology:    Oncology Normal    -- Denies Anemia:                                  EENT/Dental:  EENT/Dental Normal           Cardiovascular:  Exercise tolerance: good   Hypertension, well controlled   CAD  asymptomatic CABG/stent Dysrhythmias atrial fibrillation  Denies Angina.        ECG has been reviewed.      Coronary Artery Disease:                            Hypertension     Atrial Fibrillation     Pulmonary:     Denies Asthma.   Denies Shortness of breath.  Sleep Apnea, CPAP     Obstructive Sleep Apnea (WALI).      Education provided regarding risk of obstructive sleep apnea            Renal/:   Denies Chronic Renal Disease.                Hepatic/GI:      Denies GERD.    Not Taking GLP-1 Agonists            Musculoskeletal:  Musculoskeletal Normal                Neurological:    Denies CVA.                                    Endocrine:  Denies Diabetes.         Obesity / BMI > 30  Dermatological:  Skin Normal    Psych:  Psychiatric Normal                    Physical Exam  General: Well nourished, Cooperative, Alert and Oriented    Airway:  Mallampati: II   Mouth Opening: Normal  TM Distance: Normal  Tongue: Normal  Neck ROM: Normal ROM    Dental:  Intact, Veneers    Chest/Lungs:  Clear to auscultation, Normal Respiratory Rate    Heart:  Rate: Normal  Rhythm: Regular Rhythm  Sounds: Normal    Abdomen:  Normal, Soft, Nontender        Anesthesia Plan  Type of Anesthesia, risks & benefits discussed:    Anesthesia Type: Gen Supraglottic Airway, Regional  Intra-op Monitoring Plan: Standard ASA Monitors  Post Op Pain Control Plan: IV/PO Opioids PRN, multimodal analgesia and peripheral nerve  block  Induction:  IV  Airway Plan: Direct, Post-Induction  Informed Consent: Informed consent signed with the Patient and all parties understand the risks and agree with anesthesia plan.  All questions answered.   ASA Score: 3  Day of Surgery Review of History & Physical: H&P Update referred to the surgeon/provider.    Ready For Surgery From Anesthesia Perspective.     .       No indicators present 0 = swallows foods/liquids without difficulty

## 2025-07-25 NOTE — TRANSFER OF CARE
"Anesthesia Transfer of Care Note    Patient: Clayton Sanabria Jr.    Procedure(s) Performed: Procedure(s) (LRB):  ORIF, FRACTURE, RADIUS, DISTAL (Left)    Patient location: PACU    Anesthesia Type: general    Transport from OR: Transported from OR on room air with adequate spontaneous ventilation    Post pain: adequate analgesia    Post assessment: no apparent anesthetic complications    Post vital signs: stable    Level of consciousness: sedated    Nausea/Vomiting: no nausea/vomiting    Complications: none    Transfer of care protocol was followed      Last vitals: Visit Vitals  BP (!) 123/57 (BP Location: Right arm, Patient Position: Lying)   Pulse 70   Temp 36.8 °C (98.3 °F) (Oral)   Resp 19   Ht 6' 2" (1.88 m)   Wt 109.8 kg (242 lb)   SpO2 99%   BMI 31.07 kg/m²     "

## 2025-07-25 NOTE — DISCHARGE INSTRUCTIONS
-NO driving and NO alcohol consumption for 24 hours and while taking narcotic pain medication.    -Follow up appointment scheduled for: Call office Monday to schedule follow up in 2 weeks.     -Wound care:Begin daily dry dressing changes POD2. May cover with soft dressing or large band aid. Keep clean and dry. No showers to POD 7. Do not submerge. Do not apply ointments or creams.     -Weight bearing status: NWB. NO wrist ROM     -Monitor for signs of INFECTION: redness, swelling, drainage/pus/foul odor, fever, chills.    -Monitor extremity for good circulation (pink, warm, etc). If you received a nerve block, it can last 8-12 hours.    -Apply ICE and ELEVATE extremity as needed to aid in pain and inflammation.    -Report to your nearest ER/Notify your doctor if you experience any SUDDEN/SEVERE chest pain/abdominal pain, weakness, trouble breathing, or uncontrolled pain.     BLEEDING: if you experience uncontrollable bleeding, contact your doctor and go to ER.    NAUSEA: due to the anesthesia, you may experience nausea for up to 24 hours. If nausea and vomiting last longer, contact your doctor.     INFECTION:  watch for any signs or symptoms of infection such as chills, fever, redness or drainage at surgical site. Notify your doctor.     PAIN : take your pain medications as directed. If the pain medications are not helping, notify your doctor.

## 2025-07-28 ENCOUNTER — TELEPHONE (OUTPATIENT)
Dept: ORTHOPEDICS | Facility: CLINIC | Age: 75
End: 2025-07-28
Payer: MEDICARE

## 2025-07-28 DIAGNOSIS — S52.592A OTHER CLOSED FRACTURE OF DISTAL END OF LEFT RADIUS, INITIAL ENCOUNTER: Primary | ICD-10-CM

## 2025-07-28 RX ORDER — ASPIRIN 81 MG/1
81 TABLET ORAL 2 TIMES DAILY
Qty: 60 TABLET | Refills: 0 | Status: SHIPPED | OUTPATIENT
Start: 2025-07-28 | End: 2025-08-27

## 2025-07-28 NOTE — TELEPHONE ENCOUNTER
Attempted to contact patient 2x regarding starting Aspirin BID prior to his trip and to address patients brace concerns. No response from patient. Left brief VM requesting return call.     Aspirin sent to pharmacy on file.

## 2025-07-31 ENCOUNTER — CLINICAL SUPPORT (OUTPATIENT)
Dept: ORTHOPEDICS | Facility: CLINIC | Age: 75
End: 2025-07-31
Payer: MEDICARE

## 2025-07-31 VITALS — WEIGHT: 240.31 LBS | BODY MASS INDEX: 30.84 KG/M2 | HEIGHT: 74 IN

## 2025-07-31 DIAGNOSIS — S52.592A OTHER CLOSED FRACTURE OF DISTAL END OF LEFT RADIUS, INITIAL ENCOUNTER: Primary | ICD-10-CM

## 2025-08-01 NOTE — ANESTHESIA POSTPROCEDURE EVALUATION
Anesthesia Post Evaluation    Patient: Clayton Sanabria     Procedure(s) Performed: Procedure(s) (LRB):  ORIF, FRACTURE, RADIUS, DISTAL (Left)    Final Anesthesia Type: general      Patient location during evaluation: PACU  Patient participation: Yes- Able to Participate  Level of consciousness: awake and alert  Post-procedure vital signs: reviewed and stable  Pain management: adequate  Airway patency: patent      Anesthetic complications: no      Cardiovascular status: hemodynamically stable  Respiratory status: unassisted  Hydration status: euvolemic  Follow-up not needed.              Vitals Value Taken Time   /55 07/25/25 12:50   Temp 36.5 °C (97.7 °F) 07/25/25 11:52   Pulse 60 07/25/25 12:50   Resp 17 07/25/25 11:52   SpO2 100 % 07/25/25 12:50         Event Time   Out of Recovery 11:44:00         Pain/Ghassan Score: No data recorded

## 2025-08-28 ENCOUNTER — OFFICE VISIT (OUTPATIENT)
Dept: ORTHOPEDICS | Facility: CLINIC | Age: 75
End: 2025-08-28
Payer: OTHER GOVERNMENT

## 2025-08-28 ENCOUNTER — HOSPITAL ENCOUNTER (OUTPATIENT)
Dept: RADIOLOGY | Facility: CLINIC | Age: 75
Discharge: HOME | End: 2025-08-28
Attending: PHYSICIAN ASSISTANT
Payer: MEDICARE

## 2025-08-28 VITALS
SYSTOLIC BLOOD PRESSURE: 147 MMHG | WEIGHT: 240.31 LBS | HEART RATE: 77 BPM | HEIGHT: 74 IN | BODY MASS INDEX: 30.84 KG/M2 | RESPIRATION RATE: 18 BRPM | DIASTOLIC BLOOD PRESSURE: 73 MMHG

## 2025-08-28 DIAGNOSIS — S52.592A OTHER CLOSED FRACTURE OF DISTAL END OF LEFT RADIUS, INITIAL ENCOUNTER: ICD-10-CM

## 2025-08-28 DIAGNOSIS — S52.592A OTHER CLOSED FRACTURE OF DISTAL END OF LEFT RADIUS, INITIAL ENCOUNTER: Primary | ICD-10-CM

## 2025-08-28 PROCEDURE — 73110 X-RAY EXAM OF WRIST: CPT | Mod: LT,,, | Performed by: PHYSICIAN ASSISTANT

## 2025-08-28 PROCEDURE — 99024 POSTOP FOLLOW-UP VISIT: CPT | Mod: ,,, | Performed by: PHYSICIAN ASSISTANT

## (undated) DEVICE — BANDAGE COMPR VELCLOSE 4INX5YD

## (undated) DEVICE — GLOVE PROTEXIS HYDROGEL SZ9

## (undated) DEVICE — APPLICATOR CHLORAPREP ORN 26ML

## (undated) DEVICE — DRESSING GAUZE XEROFORM 5X9

## (undated) DEVICE — DRAPE HAND STERILE

## (undated) DEVICE — PADDING WYTEX UNDRCST 4INX4YD

## (undated) DEVICE — SPONGE COTTON TRAY 4X4IN

## (undated) DEVICE — GLOVE 9.0 PROTEXIS PI BLUE

## (undated) DEVICE — SUT ETHILON 3-0 FS-1 30

## (undated) DEVICE — Device

## (undated) DEVICE — GLOVE PROTEXIS HYDROGEL SZ6

## (undated) DEVICE — DRAPE STERI U-SHAPED 47X51IN

## (undated) DEVICE — SUT CTD VICRYL CT-1 27

## (undated) DEVICE — GLOVE PROTEXIS NEU-THERA SZ6

## (undated) DEVICE — DRILL GEMINUS 2.3X40MM

## (undated) DEVICE — SLING ARM COMFT NAVY BLU LG

## (undated) DEVICE — DRESSING XEROFORM 5X9IN